# Patient Record
Sex: FEMALE | Race: WHITE | Employment: OTHER | URBAN - METROPOLITAN AREA
[De-identification: names, ages, dates, MRNs, and addresses within clinical notes are randomized per-mention and may not be internally consistent; named-entity substitution may affect disease eponyms.]

---

## 2017-09-30 ENCOUNTER — APPOINTMENT (OUTPATIENT)
Dept: GENERAL RADIOLOGY | Age: 72
DRG: 482 | End: 2017-09-30
Attending: INTERNAL MEDICINE
Payer: MEDICARE

## 2017-09-30 ENCOUNTER — APPOINTMENT (OUTPATIENT)
Dept: GENERAL RADIOLOGY | Age: 72
DRG: 482 | End: 2017-09-30
Attending: EMERGENCY MEDICINE
Payer: MEDICARE

## 2017-09-30 ENCOUNTER — HOSPITAL ENCOUNTER (INPATIENT)
Age: 72
LOS: 3 days | Discharge: NURSING, REHAB OR LONG-TERM CARE | DRG: 482 | End: 2017-10-03
Attending: EMERGENCY MEDICINE | Admitting: INTERNAL MEDICINE
Payer: MEDICARE

## 2017-09-30 DIAGNOSIS — S72.001A HIP FRACTURE, RIGHT, CLOSED, INITIAL ENCOUNTER (HCC): Primary | ICD-10-CM
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
## 2017-09-30 PROBLEM — S72.21XA DISPLACED SUBTROCHANTERIC FRACTURE OF RIGHT FEMUR, INITIAL ENCOUNTER FOR CLOSED FRACTURE (HCC): Status: ACTIVE | Noted: 2017-09-30

## 2017-09-30 PROBLEM — R03.0 ELEVATED BP WITHOUT DIAGNOSIS OF HYPERTENSION: Status: ACTIVE | Noted: 2017-09-30

## 2017-09-30 LAB
ABO + RH BLD: NORMAL
ALBUMIN SERPL-MCNC: 3.4 G/DL (ref 3.5–5)
ALBUMIN/GLOB SERPL: 0.9 {RATIO} (ref 1.1–2.2)
ALP SERPL-CCNC: 75 U/L (ref 45–117)
ALT SERPL-CCNC: 21 U/L (ref 12–78)
ANION GAP SERPL CALC-SCNC: 7 MMOL/L (ref 5–15)
APPEARANCE UR: CLEAR
AST SERPL-CCNC: 15 U/L (ref 15–37)
BACTERIA URNS QL MICRO: NEGATIVE /HPF
BASOPHILS # BLD: 0 K/UL (ref 0–0.1)
BASOPHILS NFR BLD: 0 % (ref 0–1)
BILIRUB SERPL-MCNC: 0.5 MG/DL (ref 0.2–1)
BILIRUB UR QL: NEGATIVE
BLOOD GROUP ANTIBODIES SERPL: NORMAL
BUN SERPL-MCNC: 16 MG/DL (ref 6–20)
BUN/CREAT SERPL: 22 (ref 12–20)
CALCIUM SERPL-MCNC: 8.2 MG/DL (ref 8.5–10.1)
CAOX CRY URNS QL MICRO: ABNORMAL
CHLORIDE SERPL-SCNC: 109 MMOL/L (ref 97–108)
CO2 SERPL-SCNC: 24 MMOL/L (ref 21–32)
COLOR UR: ABNORMAL
CREAT SERPL-MCNC: 0.73 MG/DL (ref 0.55–1.02)
EOSINOPHIL # BLD: 0.1 K/UL (ref 0–0.4)
EOSINOPHIL NFR BLD: 1 % (ref 0–7)
EPITH CASTS URNS QL MICRO: ABNORMAL /LPF
ERYTHROCYTE [DISTWIDTH] IN BLOOD BY AUTOMATED COUNT: 13.7 % (ref 11.5–14.5)
GLOBULIN SER CALC-MCNC: 3.7 G/DL (ref 2–4)
GLUCOSE BLD STRIP.AUTO-MCNC: 122 MG/DL (ref 65–100)
GLUCOSE SERPL-MCNC: 112 MG/DL (ref 65–100)
GLUCOSE UR STRIP.AUTO-MCNC: NEGATIVE MG/DL
HCT VFR BLD AUTO: 31.9 % (ref 35–47)
HGB BLD-MCNC: 10.9 G/DL (ref 11.5–16)
HGB UR QL STRIP: NEGATIVE
INR PPP: 1.1 (ref 0.9–1.1)
KETONES UR QL STRIP.AUTO: NEGATIVE MG/DL
LEUKOCYTE ESTERASE UR QL STRIP.AUTO: NEGATIVE
LYMPHOCYTES # BLD: 1.6 K/UL (ref 0.8–3.5)
LYMPHOCYTES NFR BLD: 22 % (ref 12–49)
MAGNESIUM SERPL-MCNC: 2.1 MG/DL (ref 1.6–2.4)
MCH RBC QN AUTO: 31.7 PG (ref 26–34)
MCHC RBC AUTO-ENTMCNC: 34.2 G/DL (ref 30–36.5)
MCV RBC AUTO: 92.7 FL (ref 80–99)
MONOCYTES # BLD: 0.5 K/UL (ref 0–1)
MONOCYTES NFR BLD: 7 % (ref 5–13)
MUCOUS THREADS URNS QL MICRO: ABNORMAL /LPF
NEUTS SEG # BLD: 4.9 K/UL (ref 1.8–8)
NEUTS SEG NFR BLD: 70 % (ref 32–75)
NITRITE UR QL STRIP.AUTO: NEGATIVE
PH UR STRIP: 5.5 [PH] (ref 5–8)
PLATELET # BLD AUTO: 153 K/UL (ref 150–400)
POTASSIUM SERPL-SCNC: 3.9 MMOL/L (ref 3.5–5.1)
PROT SERPL-MCNC: 7.1 G/DL (ref 6.4–8.2)
PROT UR STRIP-MCNC: NEGATIVE MG/DL
PROTHROMBIN TIME: 10.6 SEC (ref 9–11.1)
RBC # BLD AUTO: 3.44 M/UL (ref 3.8–5.2)
RBC #/AREA URNS HPF: ABNORMAL /HPF (ref 0–5)
SERVICE CMNT-IMP: ABNORMAL
SODIUM SERPL-SCNC: 140 MMOL/L (ref 136–145)
SP GR UR REFRACTOMETRY: 1.02 (ref 1–1.03)
SPECIMEN EXP DATE BLD: NORMAL
UROBILINOGEN UR QL STRIP.AUTO: 0.2 EU/DL (ref 0.2–1)
WBC # BLD AUTO: 7.1 K/UL (ref 3.6–11)
WBC URNS QL MICRO: ABNORMAL /HPF (ref 0–4)

## 2017-09-30 PROCEDURE — 81001 URINALYSIS AUTO W/SCOPE: CPT | Performed by: EMERGENCY MEDICINE

## 2017-09-30 PROCEDURE — 36415 COLL VENOUS BLD VENIPUNCTURE: CPT | Performed by: EMERGENCY MEDICINE

## 2017-09-30 PROCEDURE — 65660000000 HC RM CCU STEPDOWN

## 2017-09-30 PROCEDURE — 73502 X-RAY EXAM HIP UNI 2-3 VIEWS: CPT

## 2017-09-30 PROCEDURE — 82962 GLUCOSE BLOOD TEST: CPT

## 2017-09-30 PROCEDURE — 86900 BLOOD TYPING SEROLOGIC ABO: CPT | Performed by: NURSE PRACTITIONER

## 2017-09-30 PROCEDURE — 85025 COMPLETE CBC W/AUTO DIFF WBC: CPT | Performed by: EMERGENCY MEDICINE

## 2017-09-30 PROCEDURE — 71010 XR CHEST SNGL V: CPT

## 2017-09-30 PROCEDURE — 51702 INSERT TEMP BLADDER CATH: CPT

## 2017-09-30 PROCEDURE — 74011250637 HC RX REV CODE- 250/637: Performed by: NURSE PRACTITIONER

## 2017-09-30 PROCEDURE — 96374 THER/PROPH/DIAG INJ IV PUSH: CPT

## 2017-09-30 PROCEDURE — 74011250636 HC RX REV CODE- 250/636: Performed by: NURSE PRACTITIONER

## 2017-09-30 PROCEDURE — 96376 TX/PRO/DX INJ SAME DRUG ADON: CPT

## 2017-09-30 PROCEDURE — 87086 URINE CULTURE/COLONY COUNT: CPT | Performed by: INTERNAL MEDICINE

## 2017-09-30 PROCEDURE — 96361 HYDRATE IV INFUSION ADD-ON: CPT

## 2017-09-30 PROCEDURE — 80053 COMPREHEN METABOLIC PANEL: CPT | Performed by: EMERGENCY MEDICINE

## 2017-09-30 PROCEDURE — 83735 ASSAY OF MAGNESIUM: CPT | Performed by: EMERGENCY MEDICINE

## 2017-09-30 PROCEDURE — 99285 EMERGENCY DEPT VISIT HI MDM: CPT

## 2017-09-30 PROCEDURE — 85610 PROTHROMBIN TIME: CPT | Performed by: EMERGENCY MEDICINE

## 2017-09-30 PROCEDURE — 74011250636 HC RX REV CODE- 250/636: Performed by: EMERGENCY MEDICINE

## 2017-09-30 PROCEDURE — 93005 ELECTROCARDIOGRAM TRACING: CPT

## 2017-09-30 RX ORDER — SODIUM CHLORIDE 0.9 % (FLUSH) 0.9 %
5-10 SYRINGE (ML) INJECTION AS NEEDED
Status: DISCONTINUED | OUTPATIENT
Start: 2017-09-30 | End: 2017-10-02

## 2017-09-30 RX ORDER — ACETAMINOPHEN 325 MG/1
650 TABLET ORAL
Status: DISCONTINUED | OUTPATIENT
Start: 2017-09-30 | End: 2017-09-30

## 2017-09-30 RX ORDER — MORPHINE SULFATE 2 MG/ML
4 INJECTION, SOLUTION INTRAMUSCULAR; INTRAVENOUS
Status: COMPLETED | OUTPATIENT
Start: 2017-09-30 | End: 2017-09-30

## 2017-09-30 RX ORDER — SODIUM CHLORIDE 0.9 % (FLUSH) 0.9 %
5-10 SYRINGE (ML) INJECTION EVERY 8 HOURS
Status: DISCONTINUED | OUTPATIENT
Start: 2017-09-30 | End: 2017-10-02

## 2017-09-30 RX ORDER — SODIUM CHLORIDE 9 MG/ML
100 INJECTION, SOLUTION INTRAVENOUS CONTINUOUS
Status: DISCONTINUED | OUTPATIENT
Start: 2017-09-30 | End: 2017-09-30

## 2017-09-30 RX ORDER — OXYCODONE HYDROCHLORIDE 5 MG/1
10 TABLET ORAL
Status: DISCONTINUED | OUTPATIENT
Start: 2017-09-30 | End: 2017-10-01

## 2017-09-30 RX ORDER — AMOXICILLIN 250 MG
2 CAPSULE ORAL 2 TIMES DAILY
Status: DISCONTINUED | OUTPATIENT
Start: 2017-09-30 | End: 2017-10-01

## 2017-09-30 RX ORDER — SODIUM CHLORIDE 0.9 % (FLUSH) 0.9 %
5-10 SYRINGE (ML) INJECTION EVERY 8 HOURS
Status: DISCONTINUED | OUTPATIENT
Start: 2017-09-30 | End: 2017-10-03 | Stop reason: HOSPADM

## 2017-09-30 RX ORDER — OXYCODONE HYDROCHLORIDE 5 MG/1
2.5 TABLET ORAL
Status: DISCONTINUED | OUTPATIENT
Start: 2017-09-30 | End: 2017-09-30

## 2017-09-30 RX ORDER — SODIUM CHLORIDE 9 MG/ML
75 INJECTION, SOLUTION INTRAVENOUS CONTINUOUS
Status: DISCONTINUED | OUTPATIENT
Start: 2017-09-30 | End: 2017-10-02

## 2017-09-30 RX ORDER — MORPHINE SULFATE 2 MG/ML
2 INJECTION, SOLUTION INTRAMUSCULAR; INTRAVENOUS
Status: DISCONTINUED | OUTPATIENT
Start: 2017-09-30 | End: 2017-09-30

## 2017-09-30 RX ORDER — OXYCODONE HYDROCHLORIDE 5 MG/1
5 TABLET ORAL
Status: DISCONTINUED | OUTPATIENT
Start: 2017-09-30 | End: 2017-09-30

## 2017-09-30 RX ORDER — ONDANSETRON 2 MG/ML
4 INJECTION INTRAMUSCULAR; INTRAVENOUS
Status: DISCONTINUED | OUTPATIENT
Start: 2017-09-30 | End: 2017-10-03 | Stop reason: HOSPADM

## 2017-09-30 RX ORDER — NALOXONE HYDROCHLORIDE 0.4 MG/ML
0.4 INJECTION, SOLUTION INTRAMUSCULAR; INTRAVENOUS; SUBCUTANEOUS AS NEEDED
Status: DISCONTINUED | OUTPATIENT
Start: 2017-09-30 | End: 2017-10-03 | Stop reason: HOSPADM

## 2017-09-30 RX ORDER — OXYCODONE HYDROCHLORIDE 5 MG/1
10 TABLET ORAL
Status: DISCONTINUED | OUTPATIENT
Start: 2017-09-30 | End: 2017-09-30

## 2017-09-30 RX ORDER — HYDROMORPHONE HYDROCHLORIDE 1 MG/ML
1 INJECTION, SOLUTION INTRAMUSCULAR; INTRAVENOUS; SUBCUTANEOUS
Status: DISCONTINUED | OUTPATIENT
Start: 2017-09-30 | End: 2017-10-01

## 2017-09-30 RX ORDER — ACETAMINOPHEN 325 MG/1
650 TABLET ORAL EVERY 6 HOURS
Status: DISCONTINUED | OUTPATIENT
Start: 2017-09-30 | End: 2017-10-03 | Stop reason: HOSPADM

## 2017-09-30 RX ORDER — LABETALOL HYDROCHLORIDE 5 MG/ML
10 INJECTION, SOLUTION INTRAVENOUS
Status: DISCONTINUED | OUTPATIENT
Start: 2017-09-30 | End: 2017-10-03 | Stop reason: HOSPADM

## 2017-09-30 RX ORDER — SODIUM CHLORIDE 0.9 % (FLUSH) 0.9 %
5-10 SYRINGE (ML) INJECTION AS NEEDED
Status: DISCONTINUED | OUTPATIENT
Start: 2017-09-30 | End: 2017-10-03 | Stop reason: HOSPADM

## 2017-09-30 RX ORDER — ONDANSETRON 2 MG/ML
4 INJECTION INTRAMUSCULAR; INTRAVENOUS
Status: DISCONTINUED | OUTPATIENT
Start: 2017-09-30 | End: 2017-09-30

## 2017-09-30 RX ADMIN — SODIUM CHLORIDE 75 ML/HR: 900 INJECTION, SOLUTION INTRAVENOUS at 16:52

## 2017-09-30 RX ADMIN — MORPHINE SULFATE 2 MG: 2 INJECTION, SOLUTION INTRAMUSCULAR; INTRAVENOUS at 19:22

## 2017-09-30 RX ADMIN — ONDANSETRON 4 MG: 2 INJECTION INTRAMUSCULAR; INTRAVENOUS at 14:52

## 2017-09-30 RX ADMIN — Medication 10 ML: at 16:56

## 2017-09-30 RX ADMIN — ONDANSETRON 4 MG: 2 INJECTION INTRAMUSCULAR; INTRAVENOUS at 21:05

## 2017-09-30 RX ADMIN — OXYCODONE HYDROCHLORIDE 5 MG: 5 TABLET ORAL at 19:11

## 2017-09-30 RX ADMIN — OXYCODONE HYDROCHLORIDE 5 MG: 5 TABLET ORAL at 14:53

## 2017-09-30 RX ADMIN — OXYCODONE HYDROCHLORIDE 10 MG: 5 TABLET ORAL at 22:00

## 2017-09-30 RX ADMIN — MORPHINE SULFATE 4 MG: 2 INJECTION, SOLUTION INTRAMUSCULAR; INTRAVENOUS at 12:34

## 2017-09-30 RX ADMIN — MORPHINE SULFATE 4 MG: 2 INJECTION, SOLUTION INTRAMUSCULAR; INTRAVENOUS at 14:14

## 2017-09-30 RX ADMIN — ACETAMINOPHEN 650 MG: 325 TABLET ORAL at 14:52

## 2017-09-30 RX ADMIN — SODIUM CHLORIDE 1000 ML: 0.9 INJECTION, SOLUTION INTRAVENOUS at 12:35

## 2017-09-30 RX ADMIN — HYDROMORPHONE HYDROCHLORIDE 1 MG: 1 INJECTION, SOLUTION INTRAMUSCULAR; INTRAVENOUS; SUBCUTANEOUS at 21:00

## 2017-09-30 NOTE — IP AVS SNAPSHOT
[address] [facility] 
[phone] Patient: Hudson Hooper MRN: [id number] You are allergic to the following No active allergies Recent Documentation Height Weight BMI OB Status Smoking Status 1.702 m 72.3 kg 24.96 kg/m2 Hysterectomy Never Smoker Unresulted Labs Order Current Status SAMPLE TO BLOOD BANK In process Emergency Contacts Name Discharge Info Relation Home Work Mobile [place] N/A  AT THIS TIME [6] Friend [5] [phone] About your hospitalization You were admitted on:  September 30, 2017 You last received care in the:  [facility] [place] [facility] You were discharged on:  October 3, 2017 Unit phone number:  [phone] Why you were hospitalized Your primary diagnosis was:  Not on File Your diagnoses also included:  Displaced Subtrochanteric Fracture Of Right Femur, Initial Encounter For Closed Fracture (Hcc), Elevated Bp Without Diagnosis Of Hypertension Providers Seen During Your Hospitalizations Provider Role Specialty Primary office phone Jessa Luna MD Attending Provider Emergency Medicine [phone] Bharathi Lam MD Attending Provider Internal Medicine [phone] Jayro Gagnon MD Attending Provider Internal Medicine [phone] Your Primary Care Physician (PCP) Primary Care Physician Office Phone Office Fax NONE ** None ** ** None ** Follow-up Information Follow up With Details Comments Contact Info [address] On 10/3/2017 This is your rehab provider. [address] [facility] [address]   [address] [address] 
[phone] None   None ([phone]) Patient stated that they have no PCP Please provide list of Pcp's Liana Francisco MD Schedule an appointment as soon as possible for a visit in 2 weeks  [address] Dagoberto Alamnzar 
[phone] Current Discharge Medication List  
  
START taking these medications Dose & Instructions Dispensing Information Comments Morning Noon Evening Bedtime  
 acetaminophen 325 mg tablet Commonly known as:  TYLENOL Your last dose was: Your next dose is:    
   
   
 Dose:  650 mg Take 2 Tabs by mouth every six (6) hours for 14 days. Quantity:  112 Tab Refills:  0  
     
   
   
   
  
 aspirin 81 mg chewable tablet Your last dose was: Your next dose is:    
   
   
 Dose:  81 mg Take 1 Tab by mouth two (2) times a day for 30 days. Quantity:  60 Tab Refills:  0  
     
   
   
   
  
 famotidine 20 mg tablet Commonly known as:  PEPCID Your last dose was: Your next dose is:    
   
   
 Dose:  20 mg Take 1 Tab by mouth two (2) times a day for 30 days. Quantity:  60 Tab Refills:  0  
     
   
   
   
  
 oxyCODONE IR 5 mg immediate release tablet Commonly known as:  Renee Lan Your last dose was: Your next dose is:    
   
   
 Dose:  5 mg Take 1 Tab by mouth every six (6) hours as needed for up to 7 days. Max Daily Amount: 20 mg.  
 Quantity:  20 Tab Refills:  0  
     
   
   
   
  
 polyethylene glycol 17 gram packet Commonly known as:  Lelan Situ Your last dose was: Your next dose is:    
   
   
 Dose:  17 g Take 1 Packet by mouth daily as needed. Quantity:  15 Packet Refills:  0  
     
   
   
   
  
 senna-docusate 8.6-50 mg per tablet Commonly known as:  SENNA PLUS Your last dose was: Your next dose is:    
   
   
 Dose:  1 Tab Take 1 Tab by mouth two (2) times a day. Quantity:  60 Tab Refills:  0 Where to Get Your Medications Information on where to get these meds will be given to you by the nurse or doctor. ! Ask your nurse or doctor about these medications acetaminophen 325 mg tablet  
 aspirin 81 mg chewable tablet  
 famotidine 20 mg tablet  
 oxyCODONE IR 5 mg immediate release tablet  
 polyethylene glycol 17 gram packet  
 senna-docusate 8.6-50 mg per tablet Discharge Instructions Diet: heart healthy Activity: Per rehab physician's orders Cbc in 1 week. [address] [facility] Shedrick Lennox Discharge Instruction Sheet: Hip Fracture Repair Dr. Jenn Quan Blood Clot Prevention ? Aspirin 81 mg twice daily for 30 days. Take pepcid while on Aspirin to prevent stomach upset. Pain control: 
Medications ? Typically, we will prescribe a narcotic usually 1-2 tabs every three to four hours as needed for your pain. Most patients need this only for the first few weeks. ? You should discontinue this as the pain decreases. ? Some of these medications contain a large dose of Tylenol (acetaminophen). Therefore, you should consult your pharmacist before taking any additional Tylenol at the same time. You should not drive while taking any narcotic pain medications. Take your pain medications with food to prevent nausea! Your last dose of pain medication in the hospital was given @ :__________ Ice Therapy ? You will be discharged home with ice/gel packs. ? Continue using these regularly to minimize pain and swelling, especially after physical activity. Constipation ? Pain medication and anesthesia can be constipating-this can be prevented by gentle physical activity and drinking plenty of fluid. ? It should be treated with over-the-counter medications such as Dulcolax, Miralax, Magnesium Citrate and/or Fleets enema. ? You should have a bowel movement at least every other day following surgery. Incision care ? You will be discharged with a transparent and waterproof dressing over your incision. o This should remain in place for 5-7 days after discharge. ? You will be given one additional dressing to use at home in 5-7 days if you are still having drainage ? You may shower with this dressing in place after you are discharged. o After showering pat the dressing/incision dry. ? When the incision is no longer draining, it may be left open to the air. ? DO NOT rub the incision. ? DO NOT take a tub bath or go swimming until cleared by your doctor. ? DO NOT apply lotions, oils, or creams to incision. To increase and promote healing: 
? Stop Smoking (or at least cut back on smoking). ? Eat a well-balanced diet (high in protein and vitamin C) ? If your appetite is poor, consider nutritional supplements like Ensure, Glucerna, or [employer] Instant Breakfast. 
? If you are diabetic, controlling you blood sugars is very important to prevent infection and promote wound healing. Nutrition: ? If you were on a supplement such as Ensure or Glucerna) while in the hospital, please continue using them with each meal for the next 30 days. ? Eat a well-balanced diet - High in protein, high in vitamins and minerals, especially vitamin C and zinc.  
 
Home Health: 
? If you have staples a home health nurse will remove them in about 10 days. ? Physical Therapy will come to your home to continue training for walking, transferring and exercises Physical Activity ? You can weight bear as tolerated on your new hip TTWB. ? Bed-rest may slow your recovery. ? Use your walker and take short walks about every 2 hours. ? Increase your distance each day. ? NO DRIVING until told to do so. Warning signs: Please call your physician immediately at [phone] if you have ? Bleeding from incision that is constant. ? Change in mental status (unusual behavior or confusion) ? If your incision develops redness or swelling 
? Change in wound drainage (increase in amount, color, or foul odor) ? Temperature over 101.5 degrees Fahrenheit ? Pain in the calf of your leg ? Tenderness or redness in the calf of your leg 
? Increased swelling of the thigh, ankle, calf, or foot. Emergency: CALL 911 if you have ? Shortness of breath ? Chest pain ? Localized chest pain when coughing or taking a deep breath Follow-up ? Please call your surgeons office at [phone] for a follow up appointment. o You should call as soon as you get home or the next day after discharge. o Ask to make an appointment in 2 weeks. ? You can return to work when cleared by a physician. During normal business hours you may reach Dr. Gi Goode' team directly at [phone] if you have concerns or questions. Discharge Orders None Introducing [facility] SERVICES! Elly Kelley introduces [employer] patient portal. Now you can access parts of your medical record, email your doctor's office, and request medication refills online. 1. In your internet browser, go to [email or web address]://[employer] 2. Click on the First Time User? Click Here link in the Sign In box. You will see the New Member Sign Up page. 3. Enter your [employer] Access Code exactly as it appears below. You will not need to use this code after youve completed the sign-up process. If you do not sign up before the expiration date, you must request a new code. · [employer] Access Code: [email or web address] Expires: 12/29/2017 12:37 PM 
 
4. Enter the last four digits of your Social Security Number (xxxx) and Date of Birth (mm/dd/yyyy) as indicated and click Submit. You will be taken to the next sign-up page. 5. Create a ABB[employer] ID. This will be your [employer] login ID and cannot be changed, so think of one that is secure and easy to remember. 6. Create a [employer] password. You can change your password at any time. 7. Enter your Password Reset Question and Answer. This can be used at a later time if you forget your password. 8. Enter your e-mail address. You will receive e-mail notification when new information is available in [address] 9. Click Sign Up. You can now view and download portions of your medical record. 10. Click the Download Summary menu link to download a portable copy of your medical information. If you have questions, please visit the Frequently Asked Questions section of the [employer] website. Remember, [employer] is NOT to be used for urgent needs. For medical emergencies, dial 911. Now available from your iPhone and Android! General Information Please provide this summary of care documentation to your next provider. Patient Signature:  ____________________________________________________________ Date:  ____________________________________________________________  
  
Sanjay Santos Provider Signature:  ____________________________________________________________ Date:  ____________________________________________________________

## 2017-09-30 NOTE — H&P
Hospitalist Admission Note    NAME: Gabe Vallejo   :  1945   MRN:  [id number]     Date/Time:  2017 2:58 PM    Patient PCP: None  ________________________________________________________________________    My assessment of this patient's clinical condition and my plan of care is as follows. Assessment / Plan:  Right Hip Fracture: will need surgical approach, patient has no PMH and she carries a low risk for intermediate risk procedure, noted plans for tomorrow AM, at this time patient does not shows signs of CHF, has no chest pain. Elevated BP: no Dx of HTN, will use labetalol prn. Code Status: Full Code  Surrogate Decision Maker: anastacio Simpson [id number]  DVT Prophylaxis: As per Ortho  GI Prophylaxis: not indicated  Baseline: Independent full ADL, lives by herself        Subjective:   CHIEF COMPLAINT: \"I fell and my right hip hurts\"    HISTORY OF PRESENT ILLNESS:     Gabe Vallejo is a 71 y.o.  female  without significant PMHx, presenting per EMS to ED c/o constant 9/10 sharp/non-radiating right hip pain s/p GLF PTA. Pt reports pain is worsened with any movement of her right hip. She notes she has been unable to ambulate secondary to pain since the incident. Pt specifically denies any fevers, chills, nausea, vomiting, chest pain, shortness of breath, headache, rash, diarrhea, sweating or weight loss. At this time patient is lying in bed c/o right hip pain, she trip and fell today, denies chest pain, no SOB, no N/V no diarrhea, no fever, no cough, no urinary symptoms, no other associated symptoms. We were asked to admit for work up and evaluation of the above problems. History reviewed. No pertinent past medical history.      Past Surgical History:   Procedure Laterality Date    HX GYN       hysterctomy    HX ORTHOPAEDIC      foot surgery       Social History   Substance Use Topics    Smoking status: Never Smoker    Smokeless tobacco: Never Used    Alcohol use Yes      Comment: 1-2 glasses of wine 1-2 a week        Family History   Problem Relation Age of Onset    Heart Attack Mother      No Known Allergies     Prior to Admission medications    Not on File       REVIEW OF SYSTEMS:     I am not able to complete the review of systems because: The patient is intubated and sedated    The patient has altered mental status due to his acute medical problems    The patient has baseline aphasia from prior stroke(s)    The patient has baseline dementia and is not reliable historian    The patient is in acute medical distress and unable to provide information           Total of 12 systems reviewed as follows:       POSITIVE= underlined text  Negative = text not underlined  General:  fever, chills, sweats, generalized weakness, weight loss/gain,      loss of appetite   Eyes:    blurred vision, eye pain, loss of vision, double vision  ENT:    rhinorrhea, pharyngitis   Respiratory:   cough, sputum production, SOB, ALBERTO, wheezing, pleuritic pain   Cardiology:   chest pain, palpitations, orthopnea, PND, edema, syncope   Gastrointestinal:  abdominal pain , N/V, diarrhea, dysphagia, constipation, bleeding   Genitourinary:  frequency, urgency, dysuria, hematuria, incontinence   Muskuloskeletal :  arthralgia, myalgia, back pain  Hematology:  easy bruising, nose or gum bleeding, lymphadenopathy   Dermatological: rash, ulceration, pruritis, color change / jaundice  Endocrine:   hot flashes or polydipsia   Neurological:  headache, dizziness, confusion, focal weakness, paresthesia,     Speech difficulties, memory loss, gait difficulty  Psychological: Feelings of anxiety, depression, agitation    Objective:   VITALS:    Visit Vitals    /76    Pulse 69    Temp 97.6 °F (36.4 °C)    Resp 17    Ht 5' 7\" (1.702 m)    Wt 72.3 kg (159 lb 6.3 oz)    SpO2 98%    BMI 24.96 kg/m2       PHYSICAL EXAM:    General:    Alert, cooperative, no distress, appears stated age. HEENT: Atraumatic, anicteric sclerae, pink conjunctivae     No oral ulcers, mucosa moist, throat clear, dentition fair  Neck:  Supple, symmetrical,  thyroid: non tender  Lungs:   Clear to auscultation bilaterally. No Wheezing or Rhonchi. No rales. Chest wall:  No tenderness  No Accessory muscle use. Heart:   Regular  rhythm,  No  murmur   No edema  Abdomen:   Soft, non-tender. Not distended. Bowel sounds normal  Extremities: No cyanosis. No clubbing, right leg is shortened and externally rotated     Skin turgor normal, Capillary refill normal, Radial pulse 2+  Skin:     Not pale. Not Jaundiced  No rashes   Psych:  Good insight. Not depressed. Not anxious or agitated. Neurologic: EOMs intact. No facial asymmetry. No aphasia or slurred speech. Symmetrical strength, Sensation grossly intact. Alert and oriented X 4.     _______________________________________________________________________  Care Plan discussed with:    Comments   Patient y    Family      RN y    Care Manager                    Consultant:      _______________________________________________________________________  Expected  Disposition:   Home with Family    HH/PT/OT/RN    SNF/LTC    DESHAUN y   ________________________________________________________________________  TOTAL TIME:  48 Minutes    Critical Care Provided     Minutes non procedure based      Comments    y Reviewed previous records   >50% of visit spent in counseling and coordination of care y Discussion with patient and/or family and questions answered       ________________________________________________________________________  Signed: Danilo Layne MD    Procedures: see electronic medical records for all procedures/Xrays and details which were not copied into this note but were reviewed prior to creation of Plan.     LAB DATA REVIEWED:    Recent Results (from the past 24 hour(s))   EKG, 12 LEAD, INITIAL    Collection Time: 09/30/17 12:43 PM   Result Value Ref Range Ventricular Rate 60 BPM    Atrial Rate 60 BPM    P-R Interval 126 ms    QRS Duration 86 ms    Q-T Interval 444 ms    QTC Calculation (Bezet) 444 ms    Calculated P Axis 29 degrees    Calculated R Axis 12 degrees    Calculated T Axis 31 degrees    Diagnosis       Normal sinus rhythm  Low voltage QRS  Cannot rule out Anterior infarct , age undetermined  No previous ECGs available     CBC WITH AUTOMATED DIFF    Collection Time: 09/30/17  1:12 PM   Result Value Ref Range    WBC 7.1 3.6 - 11.0 K/uL    RBC 3.44 (L) 3.80 - 5.20 M/uL    HGB 10.9 (L) 11.5 - 16.0 g/dL    HCT 31.9 (L) 35.0 - 47.0 %    MCV 92.7 80.0 - 99.0 FL    MCH 31.7 26.0 - 34.0 PG    MCHC 34.2 30.0 - 36.5 g/dL    RDW 13.7 11.5 - 14.5 %    PLATELET 698 729 - 332 K/uL    NEUTROPHILS 70 32 - 75 %    LYMPHOCYTES 22 12 - 49 %    MONOCYTES 7 5 - 13 %    EOSINOPHILS 1 0 - 7 %    BASOPHILS 0 0 - 1 %    ABS. NEUTROPHILS 4.9 1.8 - 8.0 K/UL    ABS. LYMPHOCYTES 1.6 0.8 - 3.5 K/UL    ABS. MONOCYTES 0.5 0.0 - 1.0 K/UL    ABS. EOSINOPHILS 0.1 0.0 - 0.4 K/UL    ABS.  BASOPHILS 0.0 0.0 - 0.1 K/UL   PROTHROMBIN TIME + INR    Collection Time: 09/30/17  1:12 PM   Result Value Ref Range    INR 1.1 0.9 - 1.1      Prothrombin time 10.6 9.0 - 11.1 sec   URINALYSIS W/MICROSCOPIC    Collection Time: 09/30/17  1:12 PM   Result Value Ref Range    Color YELLOW/STRAW      Appearance CLEAR CLEAR      Specific gravity 1.023 1.003 - 1.030      pH (UA) 5.5 5.0 - 8.0      Protein NEGATIVE  NEG mg/dL    Glucose NEGATIVE  NEG mg/dL    Ketone NEGATIVE  NEG mg/dL    Bilirubin NEGATIVE  NEG      Blood NEGATIVE  NEG      Urobilinogen 0.2 0.2 - 1.0 EU/dL    Nitrites NEGATIVE  NEG      Leukocyte Esterase NEGATIVE  NEG      WBC 0-4 0 - 4 /hpf    RBC 0-5 0 - 5 /hpf    Epithelial cells FEW FEW /lpf    Bacteria NEGATIVE  NEG /hpf    Mucus 1+ (A) NEG /lpf    CA Oxalate crystals FEW (A) NEG     METABOLIC PANEL, COMPREHENSIVE    Collection Time: 09/30/17  2:08 PM   Result Value Ref Range    Sodium 140 136 - 145 mmol/L    Potassium 3.9 3.5 - 5.1 mmol/L    Chloride 109 (H) 97 - 108 mmol/L    CO2 24 21 - 32 mmol/L    Anion gap 7 5 - 15 mmol/L    Glucose 112 (H) 65 - 100 mg/dL    BUN 16 6 - 20 MG/DL    Creatinine 0.73 0.55 - 1.02 MG/DL    BUN/Creatinine ratio 22 (H) 12 - 20      GFR est AA >60 >60 ml/min/1.73m2    GFR est non-AA >60 >60 ml/min/1.73m2    Calcium 8.2 (L) 8.5 - 10.1 MG/DL    Bilirubin, total 0.5 0.2 - 1.0 MG/DL    ALT (SGPT) 21 12 - 78 U/L    AST (SGOT) 15 15 - 37 U/L    Alk.  phosphatase 75 45 - 117 U/L    Protein, total 7.1 6.4 - 8.2 g/dL    Albumin 3.4 (L) 3.5 - 5.0 g/dL    Globulin 3.7 2.0 - 4.0 g/dL    A-G Ratio 0.9 (L) 1.1 - 2.2     MAGNESIUM    Collection Time: 09/30/17  2:08 PM   Result Value Ref Range    Magnesium 2.1 1.6 - 2.4 mg/dL   GLUCOSE, POC    Collection Time: 09/30/17  2:33 PM   Result Value Ref Range    Glucose (POC) 122 (H) 65 - 100 mg/dL    Performed by Stephen Tena

## 2017-09-30 NOTE — ED NOTES
TRANSFER - OUT REPORT:    Verbal report given to Adair REGAN RN(name) on Remington Naranjo  being transferred to Ortho(unit) for routine progression of care       Report consisted of patients Situation, Background, Assessment and   Recommendations(SBAR). Information from the following report(s) SBAR, Kardex, ED Summary, [facility] and Recent Results was reviewed with the receiving nurse. Lines:   Peripheral IV 09/30/17 Left Antecubital (Active)   Site Assessment Clean, dry, & intact 9/30/2017  2:59 PM   Phlebitis Assessment 0 9/30/2017  2:59 PM   Infiltration Assessment 0 9/30/2017  2:59 PM   Dressing Status Clean, dry, & intact 9/30/2017  2:59 PM   Dressing Type Transparent;Tape 9/30/2017  2:59 PM   Hub Color/Line Status Green 9/30/2017  1:22 PM        Opportunity for questions and clarification was provided.       Patient transported with:   transportation

## 2017-09-30 NOTE — ED NOTES
Bedside and Verbal shift change report given to [address] (oncoming nurse) by Dinah Gonzalez RN (offgoing nurse). Report included the following information SBAR and ED Summary.

## 2017-09-30 NOTE — ROUTINE PROCESS
Bedside and Verbal shift change report given to Caden Bartholomew (oncoming nurse) by Maribeth Ewing RN (offgoing nurse). Report included the following information SBAR, Kardex, Intake/Output, MAR and Recent Results.

## 2017-09-30 NOTE — ED NOTES
Patient resting comfortably, side rails up, call bell w/in reach, no further needs expressed at this time, aware of POC. Turned lights off for pt's comfort. Called lab to add on urine culture.

## 2017-09-30 NOTE — ED NOTES
Pt presents to ED via EMS for R. Leg/hip pain x today [place] Pt unable to stand on scene. Pt reports severe pain with movement. EMS started IV and gave pt 100 mcg Fentanyl for pain. Pt R. Leg externally rotated. Pt alert and oriented x 4. No LOC. No head injury.

## 2017-09-30 NOTE — CONSULTS
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 She had immediate pain and inability to weightbear. Transferred to the hospital by EMS. Denies loss of consciousness or other injury. No previous problems with the right hip. Denies numbness, tingling, weakness. History reviewed. No pertinent past medical history. Past Surgical History:   Procedure Laterality Date    HX GYN       hysterctomy    HX ORTHOPAEDIC      foot surgery      Prior to Admission medications    Not on File     No Known Allergies   Social History   Substance Use Topics    Smoking status: Never Smoker    Smokeless tobacco: Never Used    Alcohol use Yes      Comment: 1-2 glasses of wine 1-2 a week      History reviewed. No pertinent family history. REVIEW OF SYSTEMS: A comprehensive review of systems was negative except for that written in the HPI. Physical exam:  Pleasant 23-year-old female in mild distress. Mood and affect are normal.  She is alert and oriented. Respirations unlabored. Heart rate is regular. Right lower extremity is shortened and externally rotated. Any attempted range of motion is painful. Skin appears to be intact. Neurovascular exam distally is intact. Able to wiggle toes. Contralateral limb is unremarkable. Radiographs:  I reviewed emergency department films. She has a comminuted subtrochanteric fracture of the right femur. Assessment and plan:  Right subtrochanteric femur fracture. She ate recently. We will plan for operative fixation with intramedullary nail tomorrow morning. NPO after midnight. Consent.     Franko Short MD

## 2017-09-30 NOTE — ED PROVIDER NOTES
HPI Comments: Joel Cohi is a 71 y.o. female without significant PMHx, presenting per EMS to ED c/o constant 9/10 sharp/non-radiating right hip pain s/p GLF PTA. Pt reports pain is worsened with any movement of her right hip. She notes she has been unable to ambulate secondary to pain since the incident. Pt specifically denies any fevers, chills, nausea, vomiting, chest pain, shortness of breath, headache, rash, diarrhea, sweating or weight loss. PCP: None  Social Hx: never smoker; - EtOH; - drug use. There are no other complaints, changes, or physical findings at this time. Written by SHANTEL Pena, as dictated by [employer] Kayleen Huitron MD.      The history is provided by the patient and the EMS personnel. History reviewed. No pertinent past medical history. Past Surgical History:   Procedure Laterality Date    HX GYN       hysterctomy    HX ORTHOPAEDIC      foot surgery         History reviewed. No pertinent family history. Social History     Social History    Marital status: SINGLE     Spouse name: N/A    Number of children: N/A    Years of education: N/A     Occupational History    Not on file. Social History Main Topics    Smoking status: Never Smoker    Smokeless tobacco: Never Used    Alcohol use No    Drug use: No    Sexual activity: Not on file     Other Topics Concern    Not on file     Social History Narrative    No narrative on file         ALLERGIES: Review of patient's allergies indicates no known allergies. Review of Systems   Constitutional: Negative. Negative for activity change, appetite change, chills, fatigue, fever and unexpected weight change. HENT: Negative. Negative for congestion, hearing loss, rhinorrhea, sneezing and voice change. Eyes: Negative. Negative for pain and visual disturbance. Respiratory: Negative. Negative for apnea, cough, choking, chest tightness and shortness of breath. Cardiovascular: Negative. Negative for chest pain and palpitations. Gastrointestinal: Negative. Negative for abdominal distention, abdominal pain, blood in stool, diarrhea, nausea and vomiting. Genitourinary: Negative. Negative for difficulty urinating, flank pain, frequency and urgency. No discharge   Musculoskeletal: Positive for arthralgias (right hip). Negative for back pain, myalgias and neck stiffness. Skin: Negative. Negative for color change and rash. Neurological: Negative. Negative for dizziness, seizures, syncope, speech difficulty, weakness, numbness and headaches. Hematological: Negative for adenopathy. Psychiatric/Behavioral: Negative. Negative for agitation, behavioral problems, dysphoric mood and suicidal ideas. The patient is not nervous/anxious. All other systems reviewed and are negative. Physical Exam   Constitutional: She is oriented to person, place, and time. She appears well-developed and well-nourished. No distress. HENT:   Head: Normocephalic and atraumatic. Mouth/Throat: Oropharynx is clear and moist. No oropharyngeal exudate. Eyes: Conjunctivae and EOM are normal. Pupils are equal, round, and reactive to light. Right eye exhibits no discharge. Left eye exhibits no discharge. Neck: Normal range of motion. Neck supple. Cardiovascular: Normal rate, regular rhythm and intact distal pulses. Exam reveals no gallop and no friction rub. No murmur heard. Pulmonary/Chest: Effort normal and breath sounds normal. No respiratory distress. She has no wheezes. She has no rales. She exhibits no tenderness. Abdominal: Soft. Bowel sounds are normal. She exhibits no distension and no mass. There is no tenderness. There is no rebound and no guarding. Musculoskeletal:   Right Hip: LROM secondary to pain;   Lymphadenopathy:     She has no cervical adenopathy. Neurological: She is alert and oriented to person, place, and time. No cranial nerve deficit.  Coordination normal.   Skin: Skin is warm and dry. No rash noted. No erythema. Psychiatric: She has a normal mood and affect. Nursing note and vitals reviewed. MDM  Number of Diagnoses or Management Options  Fall, initial encounter:   Hip fracture, right, closed, initial encounter [facility]):   Diagnosis management comments: DDx: sprain, strain, contusion, fracture. Will assess with UA, basic labs, right hip films, and will treat with analgesia. Amount and/or Complexity of Data Reviewed  Clinical lab tests: ordered and reviewed  Tests in the radiology section of CPT®: ordered and reviewed  Tests in the medicine section of CPT®: ordered and reviewed  Obtain history from someone other than the patient: yes (EMS)  Review and summarize past medical records: yes  Discuss the patient with other providers: yes (Hospitalist)  Independent visualization of images, tracings, or specimens: yes    Patient Progress  Patient progress: stable    Procedures    Chief Complaint   Patient presents with    Fall     Pt presents via EMS for R. hip and leg pain x [place], No head injury, no LOC    Hip Pain       12:19 PM  The patients presenting problems have been discussed, and they are in agreement with the care plan formulated and outlined with them. I have encouraged them to ask questions as they arise throughout their visit.     MEDICATIONS GIVEN:  Medications   sodium chloride (NS) flush 5-10 mL (not administered)   sodium chloride (NS) flush 5-10 mL (not administered)   0.9% sodium chloride infusion (not administered)   sodium chloride 0.9 % bolus infusion 1,000 mL (1,000 mL IntraVENous New Bag 9/30/17 1235)   morphine injection 4 mg (4 mg IntraVENous Given 9/30/17 1234)       LABS REVIEWED:  Recent Results (from the past 24 hour(s))   EKG, 12 LEAD, INITIAL    Collection Time: 09/30/17 12:43 PM   Result Value Ref Range    Ventricular Rate 60 BPM    Atrial Rate 60 BPM    P-R Interval 126 ms    QRS Duration 86 ms    Q-T Interval 444 ms    QTC Calculation (Bezet) 444 ms    Calculated P Axis 29 degrees    Calculated R Axis 12 degrees    Calculated T Axis 31 degrees    Diagnosis       Normal sinus rhythm  Low voltage QRS  Cannot rule out Anterior infarct , age undetermined  No previous ECGs available     CBC WITH AUTOMATED DIFF    Collection Time: 09/30/17  1:12 PM   Result Value Ref Range    WBC 7.1 3.6 - 11.0 K/uL    RBC 3.44 (L) 3.80 - 5.20 M/uL    HGB 10.9 (L) 11.5 - 16.0 g/dL    HCT 31.9 (L) 35.0 - 47.0 %    MCV 92.7 80.0 - 99.0 FL    MCH 31.7 26.0 - 34.0 PG    MCHC 34.2 30.0 - 36.5 g/dL    RDW 13.7 11.5 - 14.5 %    PLATELET 477 470 - 586 K/uL    NEUTROPHILS 70 32 - 75 %    LYMPHOCYTES 22 12 - 49 %    MONOCYTES 7 5 - 13 %    EOSINOPHILS 1 0 - 7 %    BASOPHILS 0 0 - 1 %    ABS. NEUTROPHILS 4.9 1.8 - 8.0 K/UL    ABS. LYMPHOCYTES 1.6 0.8 - 3.5 K/UL    ABS. MONOCYTES 0.5 0.0 - 1.0 K/UL    ABS. EOSINOPHILS 0.1 0.0 - 0.4 K/UL    ABS.  BASOPHILS 0.0 0.0 - 0.1 K/UL   PROTHROMBIN TIME + INR    Collection Time: 09/30/17  1:12 PM   Result Value Ref Range    INR 1.1 0.9 - 1.1      Prothrombin time 10.6 9.0 - 11.1 sec   URINALYSIS W/MICROSCOPIC    Collection Time: 09/30/17  1:12 PM   Result Value Ref Range    Color YELLOW/STRAW      Appearance CLEAR CLEAR      Specific gravity 1.023 1.003 - 1.030      pH (UA) 5.5 5.0 - 8.0      Protein NEGATIVE  NEG mg/dL    Glucose NEGATIVE  NEG mg/dL    Ketone NEGATIVE  NEG mg/dL    Bilirubin NEGATIVE  NEG      Blood NEGATIVE  NEG      Urobilinogen 0.2 0.2 - 1.0 EU/dL    Nitrites NEGATIVE  NEG      Leukocyte Esterase NEGATIVE  NEG      WBC 0-4 0 - 4 /hpf    RBC 0-5 0 - 5 /hpf    Epithelial cells FEW FEW /lpf    Bacteria NEGATIVE  NEG /hpf    Mucus 1+ (A) NEG /lpf    CA Oxalate crystals FEW (A) NEG     METABOLIC PANEL, COMPREHENSIVE    Collection Time: 09/30/17  2:08 PM   Result Value Ref Range    Sodium 140 136 - 145 mmol/L    Potassium 3.9 3.5 - 5.1 mmol/L    Chloride 109 (H) 97 - 108 mmol/L    CO2 24 21 - 32 mmol/L    Anion gap 7 5 - 15 mmol/L    Glucose 112 (H) 65 - 100 mg/dL    BUN 16 6 - 20 MG/DL    Creatinine 0.73 0.55 - 1.02 MG/DL    BUN/Creatinine ratio 22 (H) 12 - 20      GFR est AA >60 >60 ml/min/1.73m2    GFR est non-AA >60 >60 ml/min/1.73m2    Calcium 8.2 (L) 8.5 - 10.1 MG/DL    Bilirubin, total 0.5 0.2 - 1.0 MG/DL    ALT (SGPT) 21 12 - 78 U/L    AST (SGOT) 15 15 - 37 U/L    Alk. phosphatase 75 45 - 117 U/L    Protein, total 7.1 6.4 - 8.2 g/dL    Albumin 3.4 (L) 3.5 - 5.0 g/dL    Globulin 3.7 2.0 - 4.0 g/dL    A-G Ratio 0.9 (L) 1.1 - 2.2     MAGNESIUM    Collection Time: 09/30/17  2:08 PM   Result Value Ref Range    Magnesium 2.1 1.6 - 2.4 mg/dL   GLUCOSE, POC    Collection Time: 09/30/17  2:33 PM   Result Value Ref Range    Glucose (POC) 122 (H) 65 - 100 mg/dL    Performed by [address]:  Patient Vitals for the past 12 hrs:   Temp Pulse Resp BP SpO2   09/30/17 1415 - 69 17 123/76 98 %   09/30/17 1402 - 69 18 139/80 100 %   09/30/17 1330 - 66 17 148/75 97 %   09/30/17 1315 - (!) 59 16 152/73 97 %   09/30/17 1310 - 61 13 146/74 99 %   09/30/17 1231 97.6 °F (36.4 °C) 62 18 (!) 185/106 98 %       RADIOLOGY RESULTS:  EXAM:  Chest single view.     INDICATION:  Right hip fracture.     COMPARISON: None.     FINDINGS: A single view of the chest demonstrates clear lungs. The cardiac and  mediastinal contours and pulmonary vascularity are normal.  The bones and soft  tissues are within normal limits.      IMPRESSION  IMPRESSION: Normal single view chest.                     EXAM:  XR HIP RT W OR WO PELV 2-3 VWS     INDICATION:   Trauma.     COMPARISON: None.     FINDINGS: An AP view of the pelvis and a frogleg lateral view of the right hip  demonstrate slightly displaced 3 part inter and subtrochanteric fracture of the  right femur. The femoral head is located.  Pelvic ring appears intact     IMPRESSION  IMPRESSION:  Right hip fracture.                  EKG interpretation: (Preliminary) 1248  Rhythm: normal sinus rhythm; and regular . Rate (approx.): 60; Axis: normal; P wave: normal; QRS interval: normal ; ST/T wave: normal;   Written by Liliane Funez, SHANTEL Scribe, as dictated by Simon Carrillo. Callie Doty, [address]:     CONSULT NOTE:   2:10 PM  Dhiraj Doty MD spoke with Dr. Awa Brennan,   Specialty: Hospitalist  Discussed pt's hx, disposition, and available diagnostic and imaging results. Reviewed care plans. Consultant will evaluate pt for admission. Written by Liliane Funez, SHANTEL Scribe, as dictated by Simon Doty MD.    PROGRESS NOTES:    Progress Note:  2:05 PM  Pt requesting something more for pain. Written by Liliane Funez ED Scribe, as dictated by Simon Doty MD.    DIAGNOSIS:    1. Hip fracture, right, closed, initial encounter ([facility] [address]    2. Fall, initial encounter      PLAN:  Admit to Hospitalist.    2:10 PM  Patient is being admitted to the hospital.  The results of their tests and reasons for their admission have been discussed with them and/or available family. They convey agreement and understanding for the need to be admitted and for their admission diagnosis. Consultation has been made with the inpatient physician specialist for hospitalization. This note is prepared by Liliane Funez, acting as Scribe for Simon Carrillo. Callie Doty, [address] Callie Doty MD: The scribe's documentation has been prepared under my direction and personally reviewed by me in its entirety. I confirm that the note above accurately reflects all work, treatment, procedures, and medical decision making performed by me.

## 2017-10-01 ENCOUNTER — APPOINTMENT (OUTPATIENT)
Dept: GENERAL RADIOLOGY | Age: 72
DRG: 482 | End: 2017-10-01
Attending: ORTHOPAEDIC SURGERY
Payer: MEDICARE

## 2017-10-01 ENCOUNTER — ANESTHESIA EVENT (OUTPATIENT)
Dept: SURGERY | Age: 72
DRG: 482 | End: 2017-10-01
Payer: MEDICARE

## 2017-10-01 ENCOUNTER — ANESTHESIA (OUTPATIENT)
Dept: SURGERY | Age: 72
DRG: 482 | End: 2017-10-01
Payer: MEDICARE

## 2017-10-01 LAB
ALBUMIN SERPL-MCNC: 2.7 G/DL (ref 3.5–5)
ALBUMIN/GLOB SERPL: 0.8 {RATIO} (ref 1.1–2.2)
ALP SERPL-CCNC: 58 U/L (ref 45–117)
ALT SERPL-CCNC: 18 U/L (ref 12–78)
ANION GAP SERPL CALC-SCNC: 6 MMOL/L (ref 5–15)
AST SERPL-CCNC: 10 U/L (ref 15–37)
ATRIAL RATE: 60 BPM
BASOPHILS # BLD: 0 K/UL (ref 0–0.1)
BASOPHILS NFR BLD: 0 % (ref 0–1)
BILIRUB SERPL-MCNC: 0.4 MG/DL (ref 0.2–1)
BUN SERPL-MCNC: 13 MG/DL (ref 6–20)
BUN/CREAT SERPL: 20 (ref 12–20)
CALCIUM SERPL-MCNC: 7.5 MG/DL (ref 8.5–10.1)
CALCULATED P AXIS, ECG09: 29 DEGREES
CALCULATED R AXIS, ECG10: 12 DEGREES
CALCULATED T AXIS, ECG11: 31 DEGREES
CHLORIDE SERPL-SCNC: 108 MMOL/L (ref 97–108)
CO2 SERPL-SCNC: 24 MMOL/L (ref 21–32)
CREAT SERPL-MCNC: 0.64 MG/DL (ref 0.55–1.02)
DIAGNOSIS, 93000: NORMAL
EOSINOPHIL # BLD: 0 K/UL (ref 0–0.4)
EOSINOPHIL NFR BLD: 0 % (ref 0–7)
ERYTHROCYTE [DISTWIDTH] IN BLOOD BY AUTOMATED COUNT: 13.8 % (ref 11.5–14.5)
EST. AVERAGE GLUCOSE BLD GHB EST-MCNC: 103 MG/DL
GLOBULIN SER CALC-MCNC: 3.2 G/DL (ref 2–4)
GLUCOSE SERPL-MCNC: 117 MG/DL (ref 65–100)
HBA1C MFR BLD: 5.2 % (ref 4.2–6.3)
HCT VFR BLD AUTO: 27.1 % (ref 35–47)
HGB BLD-MCNC: 9 G/DL (ref 11.5–16)
LYMPHOCYTES # BLD: 1.5 K/UL (ref 0.8–3.5)
LYMPHOCYTES NFR BLD: 21 % (ref 12–49)
MAGNESIUM SERPL-MCNC: 2.1 MG/DL (ref 1.6–2.4)
MCH RBC QN AUTO: 31 PG (ref 26–34)
MCHC RBC AUTO-ENTMCNC: 33.2 G/DL (ref 30–36.5)
MCV RBC AUTO: 93.4 FL (ref 80–99)
MONOCYTES # BLD: 0.9 K/UL (ref 0–1)
MONOCYTES NFR BLD: 13 % (ref 5–13)
NEUTS SEG # BLD: 4.7 K/UL (ref 1.8–8)
NEUTS SEG NFR BLD: 66 % (ref 32–75)
P-R INTERVAL, ECG05: 126 MS
PLATELET # BLD AUTO: 135 K/UL (ref 150–400)
POTASSIUM SERPL-SCNC: 4.3 MMOL/L (ref 3.5–5.1)
PROT SERPL-MCNC: 5.9 G/DL (ref 6.4–8.2)
Q-T INTERVAL, ECG07: 444 MS
QRS DURATION, ECG06: 86 MS
QTC CALCULATION (BEZET), ECG08: 444 MS
RBC # BLD AUTO: 2.9 M/UL (ref 3.8–5.2)
SODIUM SERPL-SCNC: 138 MMOL/L (ref 136–145)
TSH SERPL DL<=0.05 MIU/L-ACNC: 3.22 UIU/ML (ref 0.36–3.74)
VENTRICULAR RATE, ECG03: 60 BPM
WBC # BLD AUTO: 7.2 K/UL (ref 3.6–11)

## 2017-10-01 PROCEDURE — 77030020788: Performed by: ORTHOPAEDIC SURGERY

## 2017-10-01 PROCEDURE — 74011250636 HC RX REV CODE- 250/636

## 2017-10-01 PROCEDURE — 77030018836 HC SOL IRR NACL ICUM -A: Performed by: ORTHOPAEDIC SURGERY

## 2017-10-01 PROCEDURE — 74011250636 HC RX REV CODE- 250/636: Performed by: NURSE PRACTITIONER

## 2017-10-01 PROCEDURE — 76060000033 HC ANESTHESIA 1 TO 1.5 HR: Performed by: ORTHOPAEDIC SURGERY

## 2017-10-01 PROCEDURE — 77030031139 HC SUT VCRL2 J&J -A: Performed by: ORTHOPAEDIC SURGERY

## 2017-10-01 PROCEDURE — 74011250636 HC RX REV CODE- 250/636: Performed by: ANESTHESIOLOGY

## 2017-10-01 PROCEDURE — 76210000016 HC OR PH I REC 1 TO 1.5 HR: Performed by: ORTHOPAEDIC SURGERY

## 2017-10-01 PROCEDURE — 8E0YXBF COMPUTER ASSISTED PROCEDURE OF LOWER EXTREMITY, WITH FLUOROSCOPY: ICD-10-PCS | Performed by: ORTHOPAEDIC SURGERY

## 2017-10-01 PROCEDURE — 0QS636Z REPOSITION RIGHT UPPER FEMUR WITH INTRAMEDULLARY INTERNAL FIXATION DEVICE, PERCUTANEOUS APPROACH: ICD-10-PCS | Performed by: ORTHOPAEDIC SURGERY

## 2017-10-01 PROCEDURE — 83735 ASSAY OF MAGNESIUM: CPT | Performed by: INTERNAL MEDICINE

## 2017-10-01 PROCEDURE — 77030008467 HC STPLR SKN COVD -B: Performed by: ORTHOPAEDIC SURGERY

## 2017-10-01 PROCEDURE — 84443 ASSAY THYROID STIM HORMONE: CPT | Performed by: INTERNAL MEDICINE

## 2017-10-01 PROCEDURE — C1713 ANCHOR/SCREW BN/BN,TIS/BN: HCPCS | Performed by: ORTHOPAEDIC SURGERY

## 2017-10-01 PROCEDURE — 80053 COMPREHEN METABOLIC PANEL: CPT | Performed by: INTERNAL MEDICINE

## 2017-10-01 PROCEDURE — 73501 X-RAY EXAM HIP UNI 1 VIEW: CPT

## 2017-10-01 PROCEDURE — 74011250637 HC RX REV CODE- 250/637: Performed by: NURSE PRACTITIONER

## 2017-10-01 PROCEDURE — 74011000258 HC RX REV CODE- 258: Performed by: ORTHOPAEDIC SURGERY

## 2017-10-01 PROCEDURE — 76000 FLUOROSCOPY <1 HR PHYS/QHP: CPT

## 2017-10-01 PROCEDURE — 77030014405 HC GD ROD RMR SYNT -C: Performed by: ORTHOPAEDIC SURGERY

## 2017-10-01 PROCEDURE — 77030026438 HC STYL ET INTUB CARD -A: Performed by: ANESTHESIOLOGY

## 2017-10-01 PROCEDURE — 77030032490 HC SLV COMPR SCD KNE COVD -B

## 2017-10-01 PROCEDURE — 74011250637 HC RX REV CODE- 250/637: Performed by: INTERNAL MEDICINE

## 2017-10-01 PROCEDURE — 77030008684 HC TU ET CUF COVD -B: Performed by: ANESTHESIOLOGY

## 2017-10-01 PROCEDURE — 77030013079 HC BLNKT BAIR HGGR 3M -A: Performed by: ANESTHESIOLOGY

## 2017-10-01 PROCEDURE — 83036 HEMOGLOBIN GLYCOSYLATED A1C: CPT | Performed by: INTERNAL MEDICINE

## 2017-10-01 PROCEDURE — 74011250637 HC RX REV CODE- 250/637: Performed by: ORTHOPAEDIC SURGERY

## 2017-10-01 PROCEDURE — 85025 COMPLETE CBC W/AUTO DIFF WBC: CPT | Performed by: INTERNAL MEDICINE

## 2017-10-01 PROCEDURE — 74011250636 HC RX REV CODE- 250/636: Performed by: INTERNAL MEDICINE

## 2017-10-01 PROCEDURE — 76010000149 HC OR TIME 1 TO 1.5 HR: Performed by: ORTHOPAEDIC SURGERY

## 2017-10-01 PROCEDURE — C1769 GUIDE WIRE: HCPCS | Performed by: ORTHOPAEDIC SURGERY

## 2017-10-01 PROCEDURE — 74011000250 HC RX REV CODE- 250

## 2017-10-01 PROCEDURE — 65660000000 HC RM CCU STEPDOWN

## 2017-10-01 PROCEDURE — 74011000250 HC RX REV CODE- 250: Performed by: ORTHOPAEDIC SURGERY

## 2017-10-01 PROCEDURE — 36415 COLL VENOUS BLD VENIPUNCTURE: CPT | Performed by: INTERNAL MEDICINE

## 2017-10-01 PROCEDURE — 77030016474 HC BIT DRL QC3 SYNT -C: Performed by: ORTHOPAEDIC SURGERY

## 2017-10-01 DEVICE — SCREW BNE L36MM DIA5MM NONSTERILE TIB LT GRN TI ST CANN LOK: Type: IMPLANTABLE DEVICE | Site: FEMUR | Status: FUNCTIONAL

## 2017-10-01 RX ORDER — HYDROMORPHONE HYDROCHLORIDE 1 MG/ML
0.5 INJECTION, SOLUTION INTRAMUSCULAR; INTRAVENOUS; SUBCUTANEOUS
Status: DISCONTINUED | OUTPATIENT
Start: 2017-10-01 | End: 2017-10-03 | Stop reason: HOSPADM

## 2017-10-01 RX ORDER — POLYETHYLENE GLYCOL 3350 17 G/17G
17 POWDER, FOR SOLUTION ORAL DAILY
Status: DISCONTINUED | OUTPATIENT
Start: 2017-10-01 | End: 2017-10-03 | Stop reason: HOSPADM

## 2017-10-01 RX ORDER — ROCURONIUM BROMIDE 10 MG/ML
INJECTION, SOLUTION INTRAVENOUS AS NEEDED
Status: DISCONTINUED | OUTPATIENT
Start: 2017-10-01 | End: 2017-10-01 | Stop reason: HOSPADM

## 2017-10-01 RX ORDER — ACETAMINOPHEN 10 MG/ML
INJECTION, SOLUTION INTRAVENOUS AS NEEDED
Status: DISCONTINUED | OUTPATIENT
Start: 2017-10-01 | End: 2017-10-01 | Stop reason: HOSPADM

## 2017-10-01 RX ORDER — TRANEXAMIC ACID 100 MG/ML
INJECTION, SOLUTION INTRAVENOUS
Status: DISPENSED
Start: 2017-10-01 | End: 2017-10-01

## 2017-10-01 RX ORDER — PROPOFOL 10 MG/ML
INJECTION, EMULSION INTRAVENOUS AS NEEDED
Status: DISCONTINUED | OUTPATIENT
Start: 2017-10-01 | End: 2017-10-01 | Stop reason: HOSPADM

## 2017-10-01 RX ORDER — DIPHENHYDRAMINE HYDROCHLORIDE 50 MG/ML
12.5 INJECTION, SOLUTION INTRAMUSCULAR; INTRAVENOUS AS NEEDED
Status: DISCONTINUED | OUTPATIENT
Start: 2017-10-01 | End: 2017-10-01 | Stop reason: HOSPADM

## 2017-10-01 RX ORDER — MIDAZOLAM HYDROCHLORIDE 1 MG/ML
INJECTION, SOLUTION INTRAMUSCULAR; INTRAVENOUS AS NEEDED
Status: DISCONTINUED | OUTPATIENT
Start: 2017-10-01 | End: 2017-10-01 | Stop reason: HOSPADM

## 2017-10-01 RX ORDER — SODIUM CHLORIDE 0.9 % (FLUSH) 0.9 %
5-10 SYRINGE (ML) INJECTION AS NEEDED
Status: DISCONTINUED | OUTPATIENT
Start: 2017-10-01 | End: 2017-10-01 | Stop reason: HOSPADM

## 2017-10-01 RX ORDER — SODIUM CHLORIDE 9 MG/ML
INJECTION, SOLUTION INTRAVENOUS
Status: DISPENSED
Start: 2017-10-01 | End: 2017-10-01

## 2017-10-01 RX ORDER — SODIUM CHLORIDE, SODIUM LACTATE, POTASSIUM CHLORIDE, CALCIUM CHLORIDE 600; 310; 30; 20 MG/100ML; MG/100ML; MG/100ML; MG/100ML
25 INJECTION, SOLUTION INTRAVENOUS CONTINUOUS
Status: DISCONTINUED | OUTPATIENT
Start: 2017-10-01 | End: 2017-10-01 | Stop reason: HOSPADM

## 2017-10-01 RX ORDER — SODIUM CHLORIDE 0.9 % (FLUSH) 0.9 %
5-10 SYRINGE (ML) INJECTION EVERY 8 HOURS
Status: DISCONTINUED | OUTPATIENT
Start: 2017-10-01 | End: 2017-10-01 | Stop reason: HOSPADM

## 2017-10-01 RX ORDER — GLYCOPYRROLATE 0.2 MG/ML
INJECTION INTRAMUSCULAR; INTRAVENOUS AS NEEDED
Status: DISCONTINUED | OUTPATIENT
Start: 2017-10-01 | End: 2017-10-01 | Stop reason: HOSPADM

## 2017-10-01 RX ORDER — LIDOCAINE HYDROCHLORIDE 10 MG/ML
0.1 INJECTION, SOLUTION EPIDURAL; INFILTRATION; INTRACAUDAL; PERINEURAL AS NEEDED
Status: DISCONTINUED | OUTPATIENT
Start: 2017-10-01 | End: 2017-10-01 | Stop reason: HOSPADM

## 2017-10-01 RX ORDER — ONDANSETRON 2 MG/ML
INJECTION INTRAMUSCULAR; INTRAVENOUS AS NEEDED
Status: DISCONTINUED | OUTPATIENT
Start: 2017-10-01 | End: 2017-10-01 | Stop reason: HOSPADM

## 2017-10-01 RX ORDER — CYCLOBENZAPRINE HCL 10 MG
10 TABLET ORAL
Status: DISCONTINUED | OUTPATIENT
Start: 2017-10-01 | End: 2017-10-03 | Stop reason: HOSPADM

## 2017-10-01 RX ORDER — TRAZODONE HYDROCHLORIDE 50 MG/1
25 TABLET ORAL
Status: DISCONTINUED | OUTPATIENT
Start: 2017-10-01 | End: 2017-10-02

## 2017-10-01 RX ORDER — PHENYLEPHRINE HYDROCHLORIDE 10 MG/ML
INJECTION INTRAVENOUS AS NEEDED
Status: DISCONTINUED | OUTPATIENT
Start: 2017-10-01 | End: 2017-10-01 | Stop reason: HOSPADM

## 2017-10-01 RX ORDER — DEXAMETHASONE SODIUM PHOSPHATE 4 MG/ML
INJECTION, SOLUTION INTRA-ARTICULAR; INTRALESIONAL; INTRAMUSCULAR; INTRAVENOUS; SOFT TISSUE AS NEEDED
Status: DISCONTINUED | OUTPATIENT
Start: 2017-10-01 | End: 2017-10-01 | Stop reason: HOSPADM

## 2017-10-01 RX ORDER — CEFAZOLIN SODIUM IN 0.9 % NACL 2 G/100 ML
2 PLASTIC BAG, INJECTION (ML) INTRAVENOUS
Status: COMPLETED | OUTPATIENT
Start: 2017-10-01 | End: 2017-10-01

## 2017-10-01 RX ORDER — FENTANYL CITRATE 50 UG/ML
25 INJECTION, SOLUTION INTRAMUSCULAR; INTRAVENOUS
Status: DISCONTINUED | OUTPATIENT
Start: 2017-10-01 | End: 2017-10-01 | Stop reason: HOSPADM

## 2017-10-01 RX ORDER — SUCCINYLCHOLINE CHLORIDE 20 MG/ML
INJECTION INTRAMUSCULAR; INTRAVENOUS AS NEEDED
Status: DISCONTINUED | OUTPATIENT
Start: 2017-10-01 | End: 2017-10-01 | Stop reason: HOSPADM

## 2017-10-01 RX ORDER — LIDOCAINE HYDROCHLORIDE 20 MG/ML
INJECTION, SOLUTION EPIDURAL; INFILTRATION; INTRACAUDAL; PERINEURAL AS NEEDED
Status: DISCONTINUED | OUTPATIENT
Start: 2017-10-01 | End: 2017-10-01 | Stop reason: HOSPADM

## 2017-10-01 RX ORDER — FENTANYL CITRATE 50 UG/ML
INJECTION, SOLUTION INTRAMUSCULAR; INTRAVENOUS AS NEEDED
Status: DISCONTINUED | OUTPATIENT
Start: 2017-10-01 | End: 2017-10-01 | Stop reason: HOSPADM

## 2017-10-01 RX ORDER — HYDROMORPHONE HYDROCHLORIDE 1 MG/ML
0.2 INJECTION, SOLUTION INTRAMUSCULAR; INTRAVENOUS; SUBCUTANEOUS
Status: DISCONTINUED | OUTPATIENT
Start: 2017-10-01 | End: 2017-10-01 | Stop reason: HOSPADM

## 2017-10-01 RX ORDER — OXYCODONE HYDROCHLORIDE 5 MG/1
2.5 TABLET ORAL
Status: DISCONTINUED | OUTPATIENT
Start: 2017-10-01 | End: 2017-10-02

## 2017-10-01 RX ORDER — NEOSTIGMINE METHYLSULFATE 1 MG/ML
INJECTION INTRAVENOUS AS NEEDED
Status: DISCONTINUED | OUTPATIENT
Start: 2017-10-01 | End: 2017-10-01 | Stop reason: HOSPADM

## 2017-10-01 RX ADMIN — ONDANSETRON 4 MG: 2 INJECTION INTRAMUSCULAR; INTRAVENOUS at 08:59

## 2017-10-01 RX ADMIN — DEXAMETHASONE SODIUM PHOSPHATE 8 MG: 4 INJECTION, SOLUTION INTRA-ARTICULAR; INTRALESIONAL; INTRAMUSCULAR; INTRAVENOUS; SOFT TISSUE at 08:27

## 2017-10-01 RX ADMIN — LIDOCAINE HYDROCHLORIDE 60 MG: 20 INJECTION, SOLUTION EPIDURAL; INFILTRATION; INTRACAUDAL; PERINEURAL at 08:07

## 2017-10-01 RX ADMIN — CEFAZOLIN 2 G: 10 INJECTION, POWDER, FOR SOLUTION INTRAVENOUS; PARENTERAL at 08:09

## 2017-10-01 RX ADMIN — TRANEXAMIC ACID 1000 MG: 100 INJECTION, SOLUTION INTRAVENOUS at 10:23

## 2017-10-01 RX ADMIN — ACETAMINOPHEN 650 MG: 325 TABLET ORAL at 16:14

## 2017-10-01 RX ADMIN — PHENYLEPHRINE HYDROCHLORIDE 80 MCG: 10 INJECTION INTRAVENOUS at 08:12

## 2017-10-01 RX ADMIN — ROCURONIUM BROMIDE 15 MG: 10 INJECTION, SOLUTION INTRAVENOUS at 08:13

## 2017-10-01 RX ADMIN — HYDROMORPHONE HYDROCHLORIDE 1 MG: 1 INJECTION, SOLUTION INTRAMUSCULAR; INTRAVENOUS; SUBCUTANEOUS at 03:39

## 2017-10-01 RX ADMIN — FENTANYL CITRATE 25 MCG: 50 INJECTION, SOLUTION INTRAMUSCULAR; INTRAVENOUS at 08:15

## 2017-10-01 RX ADMIN — ONDANSETRON 4 MG: 2 INJECTION INTRAMUSCULAR; INTRAVENOUS at 06:23

## 2017-10-01 RX ADMIN — Medication 10 ML: at 11:47

## 2017-10-01 RX ADMIN — SODIUM CHLORIDE, SODIUM LACTATE, POTASSIUM CHLORIDE, AND CALCIUM CHLORIDE 25 ML/HR: 600; 310; 30; 20 INJECTION, SOLUTION INTRAVENOUS at 07:20

## 2017-10-01 RX ADMIN — OXYCODONE HYDROCHLORIDE 2.5 MG: 5 TABLET ORAL at 20:47

## 2017-10-01 RX ADMIN — OXYCODONE HYDROCHLORIDE 10 MG: 5 TABLET ORAL at 01:37

## 2017-10-01 RX ADMIN — ONDANSETRON 4 MG: 2 INJECTION INTRAMUSCULAR; INTRAVENOUS at 11:46

## 2017-10-01 RX ADMIN — Medication 10 ML: at 16:18

## 2017-10-01 RX ADMIN — ACETAMINOPHEN 1000 MG: 10 INJECTION, SOLUTION INTRAVENOUS at 08:35

## 2017-10-01 RX ADMIN — ACETAMINOPHEN 650 MG: 325 TABLET ORAL at 20:46

## 2017-10-01 RX ADMIN — CYCLOBENZAPRINE HYDROCHLORIDE 10 MG: 10 TABLET, FILM COATED ORAL at 17:07

## 2017-10-01 RX ADMIN — ROCURONIUM BROMIDE 5 MG: 10 INJECTION, SOLUTION INTRAVENOUS at 08:07

## 2017-10-01 RX ADMIN — NEOSTIGMINE METHYLSULFATE 2 MG: 1 INJECTION INTRAVENOUS at 09:02

## 2017-10-01 RX ADMIN — SODIUM CHLORIDE 75 ML/HR: 900 INJECTION, SOLUTION INTRAVENOUS at 17:08

## 2017-10-01 RX ADMIN — FENTANYL CITRATE 50 MCG: 50 INJECTION, SOLUTION INTRAMUSCULAR; INTRAVENOUS at 08:07

## 2017-10-01 RX ADMIN — PROPOFOL 100 MG: 10 INJECTION, EMULSION INTRAVENOUS at 08:07

## 2017-10-01 RX ADMIN — GLYCOPYRROLATE 0.4 MG: 0.2 INJECTION INTRAMUSCULAR; INTRAVENOUS at 09:02

## 2017-10-01 RX ADMIN — PHENYLEPHRINE HYDROCHLORIDE 160 MCG: 10 INJECTION INTRAVENOUS at 08:14

## 2017-10-01 RX ADMIN — SODIUM CHLORIDE, SODIUM LACTATE, POTASSIUM CHLORIDE, AND CALCIUM CHLORIDE: 600; 310; 30; 20 INJECTION, SOLUTION INTRAVENOUS at 09:00

## 2017-10-01 RX ADMIN — HYDROMORPHONE HYDROCHLORIDE 0.5 MG: 1 INJECTION, SOLUTION INTRAMUSCULAR; INTRAVENOUS; SUBCUTANEOUS at 11:45

## 2017-10-01 RX ADMIN — PHENYLEPHRINE HYDROCHLORIDE 160 MCG: 10 INJECTION INTRAVENOUS at 08:10

## 2017-10-01 RX ADMIN — HYDROMORPHONE HYDROCHLORIDE 1 MG: 1 INJECTION, SOLUTION INTRAMUSCULAR; INTRAVENOUS; SUBCUTANEOUS at 06:19

## 2017-10-01 RX ADMIN — OXYCODONE HYDROCHLORIDE 10 MG: 5 TABLET ORAL at 05:48

## 2017-10-01 RX ADMIN — MIDAZOLAM HYDROCHLORIDE 2 MG: 1 INJECTION, SOLUTION INTRAMUSCULAR; INTRAVENOUS at 08:00

## 2017-10-01 RX ADMIN — SUCCINYLCHOLINE CHLORIDE 120 MG: 20 INJECTION INTRAMUSCULAR; INTRAVENOUS at 08:07

## 2017-10-01 NOTE — ANESTHESIA PREPROCEDURE EVALUATION
Anesthetic History   No history of anesthetic complications            Review of Systems / Medical History  Patient summary reviewed, nursing notes reviewed and pertinent labs reviewed    Pulmonary  Within defined limits                 Neuro/Psych   Within defined limits           Cardiovascular  Within defined limits                Exercise tolerance: >4 METS     GI/Hepatic/Renal  Within defined limits              Endo/Other        Anemia     Other Findings   Comments: Right Hip Fracture         Physical Exam    Airway  Mallampati: II  TM Distance: 4 - 6 cm  Neck ROM: normal range of motion   Mouth opening: Normal     Cardiovascular  Regular rate and rhythm,  S1 and S2 normal,  no murmur, click, rub, or gallop             Dental  No notable dental hx       Pulmonary  Breath sounds clear to auscultation               Abdominal  GI exam deferred       Other Findings            Anesthetic Plan    ASA: 2  Anesthesia type: general    Monitoring Plan: BIS      Induction: Intravenous  Anesthetic plan and risks discussed with: Patient

## 2017-10-01 NOTE — PROGRESS NOTES
1611 Patient complaining of muscle spasms. POD 0 right femur insertion intramedullary nail. Paging On call Alysha Jarrett to [phone]. [phone]. [phone] Received a telephone order from Gearldine Cast PA Flexeril 10 mg TID PRN.

## 2017-10-01 NOTE — PERIOP NOTES
TRANSFER - OUT REPORT:    Verbal report given to Kandy Smart RN(name) on Dev Pierce  being transferred to [facility](unit) for routine post - op       Report consisted of patients Situation, Background, Assessment and   Recommendations(SBAR). Information from the following report(s) SBAR, Kardex, ED Summary, OR Summary, Procedure Summary, Intake/Output, MAR, Accordion, Recent Results, Med Rec Status, Cardiac Rhythm NSR and Alarm Parameters  was reviewed with the receiving nurse. Lines:   Peripheral IV 09/30/17 Left Antecubital (Active)   Site Assessment Clean, dry, & intact 10/1/2017  9:15 AM   Phlebitis Assessment 0 10/1/2017  9:15 AM   Infiltration Assessment 0 10/1/2017  9:15 AM   Dressing Status Clean, dry, & intact 10/1/2017  9:15 AM   Dressing Type Tape;Transparent 10/1/2017  9:15 AM   Hub Color/Line Status Patent;Pink;Positional;Infusing 10/1/2017  9:15 AM        Opportunity for questions and clarification was provided. Patient transported with:   Monitor  O2 @ 2 liters  Registered Nurse    [address] [place] [address] Dr. Stanislav Quinonez and confirmed that he would like to have another 1 gram of TXA piggyback infusion provided to the patient prior to transport.

## 2017-10-01 NOTE — PERIOP NOTES
TSBAR, ED Summary, Procedure Summary, MAR and Cardiac Rhythm NSR TRANSFER - IN REPORT:    Verbal report received from [address] on [facility] Blood  being received from 3218(unit) for ordered procedure      Report consisted of patients Situation, Background, Assessment and   Recommendations(SBAR). Information from the following report(nsrSBAR, ED Summary, Procedure Summary, MAR and Cardiac Rhythm nsr was reviewed with the receiving nurse. Opportunity for questions and clarification was provided. Assessment completed upon patients arrival to unit and care assumed.

## 2017-10-01 NOTE — ROUTINE PROCESS
Bedside and Verbal shift change report given to [address] (oncoming nurse) by David Costa RN (offgoing nurse). Report included the following information SBAR, Kardex, ED Summary, Intake/Output, MAR, Accordion, Recent Results, Med Rec Status and Cardiac Rhythm NSR.

## 2017-10-01 NOTE — PROGRESS NOTES
Hospitalist Progress Note    NAME: Barb Interiano   :  1945   MRN:  [id number]       Assessment / Plan:  Right hip fracture  S/p right femur insertion intramedullary nail. No acute issues, pain controlled. Advance diet as tolerated. Body mass index is 24.96 kg/(m^2). Code status: Full  DVT prophylaxis: as per ortho/surgeon    Dispo: Inpatient Rehab or SNF (followup with surgeon/PT/CM)     Subjective:     Chief Complaint / Reason for Physician Visit  No complaints    Review of Systems:  Symptom Y/N Comments  Symptom Y/N Comments   Fever/Chills n   Chest Pain n    Poor Appetite    Edema     Cough n   Abdominal Pain n    Sputum    Joint Pain     SOB/ALBERTO n   Pruritis/Rash     Nausea/vomit n   Tolerating PT/OT     Diarrhea    Tolerating Diet     Constipation    Other       Could NOT obtain due to:      Objective:     VITALS:   Last 24hrs VS reviewed since prior progress note.  Most recent are:  Patient Vitals for the past 24 hrs:   Temp Pulse Resp BP SpO2   10/01/17 1050 97.6 °F (36.4 °C) 69 12 116/62 100 %   10/01/17 1030 97.6 °F (36.4 °C) 67 10 108/57 100 %   10/01/17 1015 - 68 9 107/56 99 %   10/01/17 1000 - 71 16 114/54 99 %   10/01/17 0945 96.1 °F (35.6 °C) 79 8 110/56 96 %   10/01/17 0940 - 73 11 117/52 100 %   10/01/17 0935 - 74 9 118/56 100 %   10/01/17 0930 - 76 10 114/56 100 %   10/01/17 0925 - 80 11 114/54 100 %   10/01/17 0920 - 79 11 - 100 %   10/01/17 0916 97.8 °F (36.6 °C) 80 11 119/57 100 %   10/01/17 0915  80 (!) 7 119/57 100 %   10/01/17 0913 97.8 °F (36.6 °C) 80 8 120/56 100 %   10/01/17 0735  17 - 100 %   10/01/17 0730 - 74 15 121/55 97 %   10/01/17 0725 - 68 10 121/59 97 %   10/01/17 0720 - 73 13 131/61 94 %   10/01/17 0715 98 °F (36.7 °C) - - 112/57 -   10/01/17 0700 97.9 °F (36.6 °C) 67 18 103/55 98 %   10/01/17 0400 98.1 °F (36.7 °C) 68 18 116/50 94 %   17 2313 98.5 °F (36.9 °C) 65 18 114/58 95 %   17 2005 97.5 °F (36.4 °C) 69 18 128/64 100 %   17 1830 - 62 16 107/59 96 %   09/30/17 1800 - 65 15 107/60 100 %   09/30/17 1745 - 64 13 103/59 99 %   09/30/17 1730 - 64 12 99/56 97 %   09/30/17 1715 - 68 16 96/56 92 %   09/30/17 1700 - 66 10 98/54 99 %   09/30/17 1645 - 71 17 107/60 97 %   09/30/17 1630 - 71 14 102/51 93 %   09/30/17 1600 - 76 13 99/48 97 %   09/30/17 1530 - 62 13 134/68 98 %   09/30/17 1515 - (!) 59 11 155/81 100 %   09/30/17 1445 - 69 14 168/87 99 %   09/30/17 1430 - 66 16 130/63 96 %   09/30/17 1415 - 69 17 123/76 98 %   09/30/17 1402 - 69 18 139/80 100 %   09/30/17 1330 - 66 17 148/75 97 %   09/30/17 1315 - (!) 59 16 152/73 97 %   09/30/17 1310 - 61 13 146/74 99 %   09/30/17 1231 97.6 °F (36.4 °C) 62 18 (!) 185/106 98 %         PHYSICAL EXAM:  General: Alert, cooperative, no acute distress    EENT:  Anicteric sclerae. Resp:  CTA bilaterally, no wheezing or rales. No accessory muscle use  CV:  Regular  rhythm,  No edema  GI:  Soft, Non distended, Non tender.  +Bowel sounds  Neurologic:  Alert and oriented X 3, normal speech,   Psych:   Good insight. Not anxious nor agitated  Skin:  No rashes. No jaundice      ________________________________________________________________________  Care Plan discussed with:    Comments   Patient y    Family  y    RN     Care Manager     Consultant                        Multidiciplinary team rounds were held today with , nursing, pharmacist and clinical coordinator. Patient's plan of care was discussed; medications were reviewed and discharge planning was addressed.      ________________________________________________________________________  Total NON critical care TIME:  30 Minutes    Total CRITICAL CARE TIME Spent:   Minutes non procedure based      Comments   >50% of visit spent in counseling and coordination of care     ________________________________________________________________________  MD Grady Colin      10/01/17   0410  09/30/17   1312   WBC  7.2  7.1   HGB  9.0* 10.9*   HCT  27.1*  31.9*   PLT  135*  153     Recent Labs      10/01/17   0410  09/30/17   1408  09/30/17   1312   NA  138  140   --    K  4.3  3.9   --    CL  108  109*   --    CO2  24  24   --    GLU  117*  112*   --    BUN  13  16   --    CREA  0.64  0.73   --    CA  7.5*  8.2*   --    MG  2.1  2.1   --    ALB  2.7*  3.4*   --    TBILI  0.4  0.5   --    SGOT  10*  15   --    ALT  18  21   --    INR   --    --   1.1       Signed: Beverly Harris MD

## 2017-10-01 NOTE — ANESTHESIA POSTPROCEDURE EVALUATION
Post-Anesthesia Evaluation and Assessment    Patient: Dev Pierce MRN: [id number]  SSN: [id number]    YOB: 1945  Age: 71 y.o. Sex: female       Cardiovascular Function/Vital Signs  Visit Vitals    /56 (BP 1 Location: Right arm, BP Patient Position: At rest)    Pulse 79    Temp 35.6 °C (96.1 °F)    Resp 8    Ht 5' 7\" (1.702 m)    Wt 72.3 kg (159 lb 6.3 oz)    SpO2 96%    BMI 24.96 kg/m2       Patient is status post general anesthesia for Procedure(s):  RIGHT FEMUR INSERTION INTRA MEDULLARY NAIL. Nausea/Vomiting: None    Postoperative hydration reviewed and adequate. Pain:  Pain Scale 1: Numeric (0 - 10) (10/01/17 0945)  Pain Intensity 1: 0 (10/01/17 0945)   Managed    Neurological Status:   Neuro (WDL): Exceptions to WDL (10/01/17 0915)  Neuro  Neurologic State: Drowsy; Eyes open to voice (10/01/17 0915)  Orientation Level: Oriented to person (10/01/17 0915)  Cognition: Follows commands (10/01/17 0915)  Speech: Clear (10/01/17 0915)  LUE Motor Response: Purposeful (10/01/17 0915)  LLE Motor Response: Purposeful (10/01/17 0915)  RUE Motor Response: Purposeful (10/01/17 0915)  RLE Motor Response: Purposeful (10/01/17 0915)   At baseline    Mental Status and Level of Consciousness: Arousable    Pulmonary Status:   O2 Device: Nasal cannula (10/01/17 0945)   Adequate oxygenation and airway patent    Complications related to anesthesia: None    Post-anesthesia assessment completed.  No concerns    Signed By: Ozella Koyanagi, MD     October 1, 2017

## 2017-10-01 NOTE — PERIOP NOTES
Handoff Report from Operating Room to PACU    Report received from Nereida Silva and [address] [place] [address] regarding Barb Interiano. Surgeon(s):  Diedra Osgood, MD  And Procedure(s) (LRB):  RIGHT FEMUR INSERTION INTRA MEDULLARY NAIL (Right)  confirmed   with allergies and dressings discussed. Anesthesia type, drugs, patient history, complications, estimated blood loss, vital signs, intake and output, and last pain medication and reversal medications were reviewed.

## 2017-10-01 NOTE — OP NOTES
Kev Marshall MD - Adult Reconstruction and Total Joint Replacement    Phoebe Mercado - MRN [id number] - : 1945 (71 y.o.)      Date: 10/1/2017    PREOPERATIVE DIAGNOSIS: Right comminuted and displaced per/subtrochanteric hip fracture    POSTOPERATIVE DIAGNOSIS: same    OPERATIVE PROCEDURE: Intramedullary nail fixation of  hip fracture    IMPLANTS USED:   Implant Name Type Inv. Item Serial No.  Lot No. LRB No. Used Action   11MM/130 DEG TI MELISSA TFNA 235MM RIGHT   NA [employer] [place] [id number] Right 1 Implanted   TFNA SCREW 100MM   NA [employer] [place] [id number] Right 1 Implanted   SCR BNE LCK T25 F/IM NL [id number] --  - SNA   SCR BNE LCK T25 F/IM NL [id number] --  NA SYNTHES [place] NA Right 1 Implanted       SURGEON: Kev Marshall MD    ASSISTANT: none    ANESTHESIA: General    ESTIMATED BLOOD LOSS: 100. DRAINS: None. SPECIMENS: None. COMPLICATIONS: None. CONDITION: Stable to PACU. INDICATIONS: Intertrochanteric hip fracture. The risks, benefits, and alternatives to IM nail fixation were explained to the patient and family and they wished to proceed. They understood no guarantees could be given about the outcome of the procedure. DESCRIPTION OF PROCEDURE: After being identified in the preoperative holding area and having their operative site marked, the patient was brought back to the operating room where the  underwent anesthesia to good effect. They were transferred to the fracture table. Preoperative antibiotics were administered. The operative extremity was placed in a traction boot set-up. The uninjured lower extremity was placed in the well-leg chavez, being sure to pad all bony prominences, paying special attention to the area of the peroneal nerve. A preoperative reduction maneuver was performed under fluoroscopy. We then prepped and draped the operative extremity in the usual fashion using sterile technique. An appropriate time-out was performed.  We began by marking the lateral trajectory of the femur using fluoroscopy and a guidewire. We then returned to the A/P view. A stab incision was made proximal to the tip of the greater trochanter. The curved cannulated awl was inserted into the proximal femur. The guidewire was passed distally. We then used a 1-step reamer. The nail was inserted over the guidewire to the appropriate depth. The stacked trocars were used to place the guide pin for the lag screw in the center/center position. The indirect measuring guide was used to determine lag screw length. We drilled for this and placed the lag screw. We then placed the set screw. We then removed the guide pin and used the stacked trocars for the distal interlock, which was placed in a static fashion. Final hardware placement was confirmed on orthogonal views. The wounds were then closed in layers with Vicryl. Final skin closure was performed with skin staples. Sterile compressive dressings were applied. The patient was moved to the stretcher and taken to the recovery room in stable condition. At the conclusion of the procedure all counts were correct. There were no immediate complications. Additional procedure:    Procedure performed: Intraoperative fluoroscopy for Procedure(s):  RIGHT FEMUR INSERTION INTRA MEDULLARY NAIL    CPT code: 47369    Anesthesia: Other    Surgeon(s) and Role:     * Masha Tay MD - Primary    This note describes the use of intraoperative fluoroscopy. Fluoroscopic imaging was utilized in orthogonal planes as well as using live technique for all phases of the procedure, described separately in the operative report, including fracture reduction and hardware placement.     Masha Tay MD

## 2017-10-02 LAB
ANION GAP SERPL CALC-SCNC: 5 MMOL/L (ref 5–15)
BACTERIA SPEC CULT: NORMAL
BASOPHILS # BLD: 0 K/UL (ref 0–0.1)
BASOPHILS NFR BLD: 0 % (ref 0–1)
BUN SERPL-MCNC: 11 MG/DL (ref 6–20)
BUN/CREAT SERPL: 14 (ref 12–20)
CALCIUM SERPL-MCNC: 7.8 MG/DL (ref 8.5–10.1)
CC UR VC: NORMAL
CHLORIDE SERPL-SCNC: 106 MMOL/L (ref 97–108)
CO2 SERPL-SCNC: 26 MMOL/L (ref 21–32)
CREAT SERPL-MCNC: 0.79 MG/DL (ref 0.55–1.02)
EOSINOPHIL # BLD: 0 K/UL (ref 0–0.4)
EOSINOPHIL NFR BLD: 0 % (ref 0–7)
ERYTHROCYTE [DISTWIDTH] IN BLOOD BY AUTOMATED COUNT: 13.6 % (ref 11.5–14.5)
GLUCOSE SERPL-MCNC: 120 MG/DL (ref 65–100)
HCT VFR BLD AUTO: 24.1 % (ref 35–47)
HGB BLD-MCNC: 8 G/DL (ref 11.5–16)
LYMPHOCYTES # BLD: 1.3 K/UL (ref 0.8–3.5)
LYMPHOCYTES NFR BLD: 16 % (ref 12–49)
MCH RBC QN AUTO: 31.3 PG (ref 26–34)
MCHC RBC AUTO-ENTMCNC: 33.2 G/DL (ref 30–36.5)
MCV RBC AUTO: 94.1 FL (ref 80–99)
MONOCYTES # BLD: 1.4 K/UL (ref 0–1)
MONOCYTES NFR BLD: 17 % (ref 5–13)
NEUTS SEG # BLD: 5.6 K/UL (ref 1.8–8)
NEUTS SEG NFR BLD: 67 % (ref 32–75)
PLATELET # BLD AUTO: 107 K/UL (ref 150–400)
POTASSIUM SERPL-SCNC: 4.5 MMOL/L (ref 3.5–5.1)
RBC # BLD AUTO: 2.56 M/UL (ref 3.8–5.2)
SERVICE CMNT-IMP: NORMAL
SODIUM SERPL-SCNC: 137 MMOL/L (ref 136–145)
WBC # BLD AUTO: 8.2 K/UL (ref 3.6–11)

## 2017-10-02 PROCEDURE — 85025 COMPLETE CBC W/AUTO DIFF WBC: CPT | Performed by: INTERNAL MEDICINE

## 2017-10-02 PROCEDURE — 97165 OT EVAL LOW COMPLEX 30 MIN: CPT

## 2017-10-02 PROCEDURE — 36415 COLL VENOUS BLD VENIPUNCTURE: CPT | Performed by: INTERNAL MEDICINE

## 2017-10-02 PROCEDURE — 74011250637 HC RX REV CODE- 250/637: Performed by: INTERNAL MEDICINE

## 2017-10-02 PROCEDURE — 65660000000 HC RM CCU STEPDOWN

## 2017-10-02 PROCEDURE — G8978 MOBILITY CURRENT STATUS: HCPCS | Performed by: PHYSICAL THERAPIST

## 2017-10-02 PROCEDURE — 80048 BASIC METABOLIC PNL TOTAL CA: CPT | Performed by: ORTHOPAEDIC SURGERY

## 2017-10-02 PROCEDURE — 74011250637 HC RX REV CODE- 250/637: Performed by: NURSE PRACTITIONER

## 2017-10-02 PROCEDURE — 74011250636 HC RX REV CODE- 250/636: Performed by: EMERGENCY MEDICINE

## 2017-10-02 PROCEDURE — G8979 MOBILITY GOAL STATUS: HCPCS | Performed by: PHYSICAL THERAPIST

## 2017-10-02 PROCEDURE — 97116 GAIT TRAINING THERAPY: CPT | Performed by: PHYSICAL THERAPIST

## 2017-10-02 PROCEDURE — 74011250637 HC RX REV CODE- 250/637: Performed by: ORTHOPAEDIC SURGERY

## 2017-10-02 PROCEDURE — 97161 PT EVAL LOW COMPLEX 20 MIN: CPT | Performed by: PHYSICAL THERAPIST

## 2017-10-02 RX ORDER — FAMOTIDINE 20 MG/1
20 TABLET, FILM COATED ORAL 2 TIMES DAILY
Status: DISCONTINUED | OUTPATIENT
Start: 2017-10-02 | End: 2017-10-03 | Stop reason: HOSPADM

## 2017-10-02 RX ORDER — GUAIFENESIN 100 MG/5ML
81 LIQUID (ML) ORAL 2 TIMES DAILY
Status: DISCONTINUED | OUTPATIENT
Start: 2017-10-02 | End: 2017-10-03 | Stop reason: HOSPADM

## 2017-10-02 RX ORDER — OXYCODONE HYDROCHLORIDE 5 MG/1
5-10 TABLET ORAL
Status: DISCONTINUED | OUTPATIENT
Start: 2017-10-02 | End: 2017-10-03 | Stop reason: HOSPADM

## 2017-10-02 RX ADMIN — ASPIRIN 81 MG 81 MG: 81 TABLET ORAL at 18:13

## 2017-10-02 RX ADMIN — ACETAMINOPHEN 650 MG: 325 TABLET ORAL at 14:30

## 2017-10-02 RX ADMIN — SODIUM CHLORIDE 500 ML: 900 INJECTION, SOLUTION INTRAVENOUS at 00:00

## 2017-10-02 RX ADMIN — OXYCODONE HYDROCHLORIDE 10 MG: 5 TABLET ORAL at 14:32

## 2017-10-02 RX ADMIN — OXYCODONE HYDROCHLORIDE 2.5 MG: 5 TABLET ORAL at 07:04

## 2017-10-02 RX ADMIN — POLYETHYLENE GLYCOL 3350 17 G: 17 POWDER, FOR SOLUTION ORAL at 08:55

## 2017-10-02 RX ADMIN — OXYCODONE HYDROCHLORIDE 2.5 MG: 5 TABLET ORAL at 10:52

## 2017-10-02 RX ADMIN — FAMOTIDINE 20 MG: 20 TABLET ORAL at 10:49

## 2017-10-02 RX ADMIN — Medication 10 ML: at 19:07

## 2017-10-02 RX ADMIN — OXYCODONE HYDROCHLORIDE 5 MG: 5 TABLET ORAL at 18:12

## 2017-10-02 RX ADMIN — ACETAMINOPHEN 650 MG: 325 TABLET ORAL at 08:54

## 2017-10-02 RX ADMIN — ACETAMINOPHEN 650 MG: 325 TABLET ORAL at 21:23

## 2017-10-02 RX ADMIN — CYCLOBENZAPRINE HYDROCHLORIDE 10 MG: 10 TABLET, FILM COATED ORAL at 03:09

## 2017-10-02 RX ADMIN — FAMOTIDINE 20 MG: 20 TABLET ORAL at 18:12

## 2017-10-02 RX ADMIN — ASPIRIN 81 MG 81 MG: 81 TABLET ORAL at 10:49

## 2017-10-02 RX ADMIN — Medication 10 ML: at 21:23

## 2017-10-02 NOTE — PROGRESS NOTES
Consult acknowledged    As per PT recommendation IP rehab referral send to [facility] through [facility]. FOC given. CM will followup with [facility].     [place]  Ext [phone]

## 2017-10-02 NOTE — INTERDISCIPLINARY ROUNDS
Bedside interdisciplinary rounds were held today to discuss patient plan of care and outcomes. The following members were present: Physician, Nurse, Nurse Practitioner, Clinical Care Leader, Pharmacy, Physical Therapy, and Case Management. Plan:  TTWB. ALISTAIR Ba[place] and monitor for void. Await therapy recommendations - goal for home.

## 2017-10-02 NOTE — PROGRESS NOTES
Problem: Self Care Deficits Care Plan (Adult)  Goal: *Acute Goals and Plan of Care (Insert Text)  Occupational Therapy Goals  Initiated 10/2/2017  1. Patient will perform LB dressing using AE PRN with supervision/set-up within 7 day(s). 2. Patient will perform functional mobility while maintaining TTWB with independence within 7 day(s). 3. Patient will perform seated bathing task with supervision/set-up within 7 day(s). 4. Patient will perform toilet transfers in bathroom with RW and supervision/setup within 7 day(s). 5. Patient will perform all aspects of toileting with supervision/set-up within 7 day(s). OCCUPATIONAL THERAPY EVALUATION  Patient: Lázaro Ahmadi (45 y.o. female)  Date: 10/2/2017  Primary Diagnosis: right hip fracture  Closed right hip fracture (HCC)  Procedure(s) (LRB):  RIGHT FEMUR INSERTION INTRA MEDULLARY NAIL (Right) 1 Day Post-Op   Precautions:   TTWB      ASSESSMENT :  Based on the objective data described below, the patient presents with limited mobility due to WB restrictions, decreased endurance, pain with upright activity, risk for falls and decline in functional independence s/p R femur IM nailing pod 1. Pt received EOB with PT, required min A x 2 for sit to stand, verbal cues for sequencing on RW and advancement of LEs. Pt initially required A to advance R LE but with increased time pt able to advance R LE while adhering to TTWB status. She is needing up to mod A for LB ADLs and would benefit from hip kit items to assist with LB dressing. Pt is very motivated and would be an appropriate IP rehab as she lives alone and was completely independent prior. Patient will benefit from skilled intervention to address the above impairments.   Patients rehabilitation potential is considered to be Excellent  Factors which may influence rehabilitation potential include:   [X]             None noted  [ ]             Mental ability/status  [ ]             Medical condition  [ ] Home/family situation and support systems  [ ]             Safety awareness  [ ]             Pain tolerance/management  [ ]             Other:        PLAN :  Recommendations and Planned Interventions:  [X]               Self Care Training                  [X]        Therapeutic Activities  [X]               Functional Mobility Training    [ ]        Cognitive Retraining  [X]               Therapeutic Exercises           [X]        Endurance Activities  [X]               Balance Training                   [ ]        Neuromuscular Re-Education  [ ]               Visual/Perceptual Training     [X]   Home Safety Training  [X]               Patient Education                 [X]        Family Training/Education  [ ]               Other (comment):     Frequency/Duration: Patient will be followed by occupational therapy 5 times a week to address goals. Discharge Recommendations: Inpatient Rehab  Further Equipment Recommendations for Discharge: TBD       SUBJECTIVE:   Patient stated It hurts.       OBJECTIVE DATA SUMMARY:   HISTORY:   History reviewed. No pertinent past medical history.   Past Surgical History:   Procedure Laterality Date    HX GYN          hysterctomy    HX ORTHOPAEDIC         foot surgery        Prior Level of Function/Home Situation: lives alone, independent, active, driving  Expanded or extensive additional review of patient history:      Home Situation  Home Environment: Private residence  # Steps to Enter: 4  Rails to Enter: Yes  Hand Rails : Right  One/Two Story Residence: Two story  Interior Rails: Right  Living Alone: Yes  Support Systems: Friends \ neighbors, Spouse/Significant Other/Partner  Patient Expects to be Discharged to[de-identified] Private residence  Current DME Used/Available at Home: None (built-in shower chair)  Tub or Shower Type: Shower  [ ]  Right hand dominant   [ ]  Left hand dominant     EXAMINATION OF PERFORMANCE DEFICITS:  Cognitive/Behavioral Status:  Neurologic State: Alert; Appropriate for age  Orientation Level: Oriented X4  Cognition: Appropriate decision making; Appropriate for age attention/concentration; Appropriate safety awareness              Skin: intact     Edema: R hip, ice applied end of session     Hearing: Auditory  Auditory Impairment: None     Vision/Perceptual:                 intact                     Range of Motion:     AROM: Within functional limits (except LLE limited 2/2 pain)                          Strength:     Strength: Within functional limits                 Coordination:  Coordination: Within functional limits  Fine Motor Skills-Upper: Left Intact; Right Intact    Gross Motor Skills-Upper: Left Intact; Right Intact     Tone & Sensation:     Tone: Normal  Sensation: Intact                       Balance:  Sitting: Intact  Standing: Impaired  Standing - Static: Fair;Constant support  Standing - Dynamic : Fair (using RW for support)     Functional Mobility and Transfers for ADLs:  Bed Mobility:  Supine to Sit: Moderate assistance;Minimum assistance;Assist x2; Additional time  Scooting: Minimum assistance; Moderate assistance;Assist x2; Additional time     Transfers:  Sit to Stand: Minimum assistance;Assist x2  Stand to Sit: Minimum assistance;Assist x2  Bed to Chair: Minimum assistance;Assist x2  Toilet Transfer : Minimum assistance;Assist x2 (inferred by transfer to chair)     ADL Assessment:  Feeding: Independent     Oral Facial Hygiene/Grooming: Modified Independent (after setup)     Bathing: Moderate assistance     Upper Body Dressing: Supervision     Lower Body Dressing: Moderate assistance (decreased functional reach to B ankles)     Toileting: Moderate assistance                 ADL Intervention and task modifications:    Pt educated on role of OT and required verbal cues for safety and proper hand placement during ADLs/functional transfers.                               Functional Measure:  Barthel Index:      Bathin  Bladder: 10  Bowels: 10  Groomin  Dressin  Feeding: 10  Mobility: 0  Stairs: 0  Toilet Use: 5  Transfer (Bed to Chair and Back): 5  Total: 50         Barthel and G-code impairment scale:  Percentage of impairment CH  0% CI  1-19% CJ  20-39% CK  40-59% CL  60-79% CM  80-99% CN  100%   Barthel Score 0-100 100 99-80 79-60 59-40 20-39 1-19    0   Barthel Score 0-20 20 17-19 13-16 9-12 5-8 1-4 0      The Barthel ADL Index: Guidelines  1. The index should be used as a record of what a patient does, not as a record of what a patient could do. 2. The main aim is to establish degree of independence from any help, physical or verbal, however minor and for whatever reason. 3. The need for supervision renders the patient not independent. 4. A patient's performance should be established using the best available evidence. Asking the patient, friends/relatives and nurses are the usual sources, but direct observation and common sense are also important. However direct testing is not needed. 5. Usually the patient's performance over the preceding 24-48 hours is important, but occasionally longer periods will be relevant. 6. Middle categories imply that the patient supplies over 50 per cent of the effort. 7. Use of aids to be independent is allowed. Bernarda Metcalf., Barthel, D.W. (7852). Functional evaluation: the Barthel Index. [facility] (14)2. [facility] TREV Tavares, Barbie Trujillo., Sanya Gasca., [place], [address] [place] [address] (). Measuring the change indisability after inpatient rehabilitation; comparison of the responsiveness of the Barthel Index and Functional [place] Measure. Journal of Neurology, Neurosurgery, and Psychiatry, 66(4), 955-683. Aarti Serrato, N.J.A, JAN WuJ.M, & Jose Alberto Olguin, M.A. (2004.) Assessment of post-stroke quality of life in cost-effectiveness studies: The usefulness of the Barthel Index and the EuroQoL-5D. Quality of Life Research, 13, 640-87            G codes:   In compliance with CMSs Claims Based Outcome Reporting, the following G-code set was chosen for this patient based on their primary functional limitation being treated: The outcome measure chosen to determine the severity of the functional limitation was the barthel index with a score of 50/100 which was correlated with the impairment scale. · Self Care:               - CURRENT STATUS:    CK - 40%-59% impaired, limited or restricted               - GOAL STATUS:           CJ - 20%-39% impaired, limited or restricted               - D/C STATUS:                       ---------------To be determined---------------      Occupational Therapy Evaluation Charge Determination   History Examination Decision-Making   LOW Complexity : Brief history review  LOW Complexity : 1-3 performance deficits relating to physical, cognitive , or psychosocial skils that result in activity limitations and / or participation restrictions  LOW Complexity : No comorbidities that affect functional and no verbal or physical assistance needed to complete eval tasks       Based on the above components, the patient evaluation is determined to be of the following complexity level: LOW   Pain:  Pain Scale 1: Numeric (0 - 10)  Pain Intensity 1: 5  Pain Location 1: Hip  Pain Orientation 1: Right  Pain Description 1: Aching  Pain Intervention(s) 1: Medication (see MAR)  Activity Tolerance:   VSS  Please refer to the flowsheet for vital signs taken during this treatment. After treatment:   [X] Patient left in no apparent distress sitting up in chair  [ ] Patient left in no apparent distress in bed  [X] Call bell left within reach  [X] Nursing notified  [ ] Caregiver present  [ ] Bed alarm activated      COMMUNICATION/EDUCATION:   The patients plan of care was discussed with: Physical Therapist, Registered Nurse and Rehabilitation Attendant. [ ] Home safety education was provided and the patient/caregiver indicated understanding.   [X] Patient/family have participated as able in goal setting and plan of care. [X] Patient/family agree to work toward stated goals and plan of care. [ ] Patient understands intent and goals of therapy, but is neutral about his/her participation. [ ] Patient is unable to participate in goal setting and plan of care. This patients plan of care is appropriate for delegation to [facility].      Thank you for this referral.  Yolanda Mireles OT  Time Calculation: 15 mins

## 2017-10-02 NOTE — PROGRESS NOTES
Asked patient to turn to perform skin assessment. Patient refused to turn. Patient education taught, still refused to turn after education.

## 2017-10-02 NOTE — PROGRESS NOTES
Bedside shift change report given to TAQUERIA Caban (oncoming nurse) by Sherry Scott RN and Ronaldo Holguin RN (offgoing nurse). Report included the following information SBAR, Procedure Summary, Intake/Output, MAR and Recent Results.

## 2017-10-02 NOTE — PROGRESS NOTES
Occupational Therapy  Chart reviewed, referral received for OT. Pt currently without formal WB status orders in chart. Discussed with ortho NP who clarify with ortho MD. Will hold until then. Thank you.  Zach Raphael MS, OTR/L

## 2017-10-02 NOTE — PROGRESS NOTES
Problem: Mobility Impaired (Adult and Pediatric)  Goal: *Acute Goals and Plan of Care (Insert Text)  Physical Therapy Goals  Initiated 10/2/2017  1. Patient will move from supine to sit and sit to supine , scoot up and down and roll side to side in bed with independence within 7 day(s). 2. Patient will transfer from bed to chair and chair to bed with modified independence using the least restrictive device within 7 day(s). 3. Patient will perform sit to stand with modified independence within 7 day(s). 4. Patient will ambulate with modified independence for 50 feet with the least restrictive device within 7 day(s). 5. Patient will ascend/descend 12 stairs with 1 handrail(s) with supervision/set-up within 7 day(s). PHYSICAL THERAPY EVALUATION  Patient: Manfred Fierro (47 y.o. female)  Date: 10/2/2017  Primary Diagnosis: right hip fracture  Closed right hip fracture (HCC)  Procedure(s) (LRB):  RIGHT FEMUR INSERTION INTRA MEDULLARY NAIL (Right) 1 Day Post-Op   Precautions: TDWB RLE         ASSESSMENT :  Based on the objective data described below, the patient presents with impaired functional mobility, balance and endurance following s/p fall with subsequent hip fx requiring IM nailing. Weight bearing status on R is TDWB and patient demonstrates good ability to maintain TDWB. Patient requiring min/mod A of 2 for bed mobility and noted to report increased pain with all movement. Patient requires min A of 2 for transfers and ambulation. Gait is slow and unsteady demonstrating short shuffled steps and poor ability to clear L foot from floor. Patient only able to ambulate 7 feet this am.  Prior to fall, patient reports complete independence with all mobility and was very active at baseline. Recommend inpatient rehab following discharge to regain functional independence prior to returning to home. Patient will benefit from skilled intervention to address the above impairments.   Patients rehabilitation potential is considered to be Good  Factors which may influence rehabilitation potential include:   [X]         None noted  [ ]         Mental ability/status  [ ]         Medical condition  [ ]         Home/family situation and support systems  [ ]         Safety awareness  [ ]         Pain tolerance/management  [ ]         Other:        PLAN :  Recommendations and Planned Interventions:  [X]           Bed Mobility Training             [ ]    Neuromuscular Re-Education  [X]           Transfer Training                   [ ]    Orthotic/Prosthetic Training  [X]           Gait Training                         [ ]    Modalities  [X]           Therapeutic Exercises           [ ]    Edema Management/Control  [ ]           Therapeutic Activities            [X]    Patient and Family Training/Education  [X]           Other (comment): stairs      Frequency/Duration: Patient will be followed by physical therapy  twice daily to address goals. Discharge Recommendations: Inpatient Rehab  Further Equipment Recommendations for Discharge: TBD by rehab       SUBJECTIVE:   Patient stated the pressure on my hip when I sit really hurts.       OBJECTIVE DATA SUMMARY:   HISTORY:    History reviewed. No pertinent past medical history.   Past Surgical History:   Procedure Laterality Date    HX GYN          hysterctomy    HX ORTHOPAEDIC         foot surgery     Prior Level of Function/Home Situation: patient reports complete independence with all mobility and ADLs; very active at baseline        [address]: Private residence  # Steps to Enter: 4  Rails to Enter: Yes  Hand Rails : Right  One/Two Story Residence: Two story  Interior Rails: Right  Living Alone: Yes  Support Systems: Friends \ neighbors, Spouse/Significant Other/Partner  Patient Expects to be Discharged to[de-identified] Private residence  Current DME Used/Available at Home: None (built-in shower chair)  Tub or Shower Type: Shower EXAMINATION/PRESENTATION/DECISION MAKING:   Critical Behavior:  Neurologic State: Alert, Appropriate for age  Orientation Level: Oriented X4  Cognition: Appropriate decision making, Appropriate for age attention/concentration, Appropriate safety awareness     Hearing: Auditory  Auditory Impairment: None     Range Of Motion:  AROM: Within functional limits (except LLE limited 2/2 pain)                       Strength:    Strength: Within functional limits                    Tone & Sensation:   Tone: Normal              Sensation: Intact               Coordination:  Coordination: Within functional limits  Vision:      Functional Mobility:  Bed Mobility:     Supine to Sit: Moderate assistance;Minimum assistance;Assist x2; Additional time     Scooting: Minimum assistance; Moderate assistance;Assist x2; Additional time  Transfers:  Sit to Stand: Minimum assistance;Assist x2  Stand to Sit: Minimum assistance;Assist x2        Bed to Chair: Minimum assistance;Assist x2              Balance:   Sitting: Intact  Standing: Impaired  Standing - Static: Fair;Constant support  Standing - Dynamic : Fair (using RW for support)  Ambulation/Gait Training:  Distance (ft): 7 Feet (ft)  Assistive Device: Walker, rolling  Ambulation - Level of Assistance: Minimal assistance        Gait Abnormalities: Decreased step clearance  Right Side Weight Bearing: Toe touch     Base of Support: Shift to left     Speed/Jenna: Pace decreased (<100 feet/min); Shuffled; Slow  Step Length: Left shortened;Right shortened      Gait is slow and unsteady demonstrating poor ability to advance LLE; good ability to maintain TDWB on R              Functional Measure:     Elder Mobility Scale      2/20            EMS and G-code impairment scale:  Percentage of impairment CH  0% CI  1-19% CJ  20-39% CK  40-59% CL  60-79% CM  80-99% CN  100%   EMS Score 0-20 20 17-19 13-16 9-12 5-8 1-4 0      Scores under 10  generally these patients are dependent in mobility maneuvers; require help with  basic ADL, such as transfers, toileting and dressing. Scores between 10  13  generally these patients are borderline in terms of safe mobility and  independence in ADL i.e. they require some help with some mobility maneuvers. Scores over 14  Generally these patients are able to perform mobility maneuvers alone and safely  and are independent in basic ADL. G codes: In compliance with CMSs Claims Based Outcome Reporting, the following G-code set was chosen for this patient based on their primary functional limitation being treated: The outcome measure chosen to determine the severity of the functional limitation was the Elderly mobility scale with a score of 2/20 which was correlated with the impairment scale. · Mobility - Walking and Moving Around:               - CURRENT STATUS:    CM - 80%-99% impaired, limited or restricted               - GOAL STATUS:           CI - 1%-19% impaired, limited or restricted               - D/C STATUS:                       ---------------To be determined---------------            Pain:  Pain Scale 1: Numeric (0 - 10)  Pain Intensity 1: 5  Pain Location 1: Hip  Pain Orientation 1: Right  Pain Description 1: Aching  Pain Intervention(s) 1: Medication (see MAR)     After treatment:   [X]         Patient left in no apparent distress sitting up in chair  [ ]         Patient left in no apparent distress in bed  [X]         Call bell left within reach  [X]         Nursing notified  [X]         Caregiver present  [ ]         Bed alarm activated      COMMUNICATION/EDUCATION:   The patients plan of care was discussed with: Occupational Therapist, Registered Nurse and Rehabilitation Attendant. [X]         Fall prevention education was provided and the patient/caregiver indicated understanding. [X]         Patient/family have participated as able in goal setting and plan of care.   [X]         Patient/family agree to work toward stated goals and plan of care. [ ]         Patient understands intent and goals of therapy, but is neutral about his/her participation. [ ]         Patient is unable to participate in goal setting and plan of care.      Thank you for this referral.  Cesario Leung, PT   Time Calculation: 29 mins

## 2017-10-02 NOTE — PROGRESS NOTES
Problem: Mobility Impaired (Adult and Pediatric)  Goal: *Acute Goals and Plan of Care (Insert Text)  Physical Therapy Goals  Initiated 10/2/2017  1. Patient will move from supine to sit and sit to supine , scoot up and down and roll side to side in bed with independence within 7 day(s). 2. Patient will transfer from bed to chair and chair to bed with modified independence using the least restrictive device within 7 day(s). 3. Patient will perform sit to stand with modified independence within 7 day(s). 4. Patient will ambulate with modified independence for 50 feet with the least restrictive device within 7 day(s). 5. Patient will ascend/descend 12 stairs with 1 handrail(s) with supervision/set-up within 7 day(s). PHYSICAL THERAPY TREATMENT  Patient: Eleazar Carrera (89 y.o. female)  Date: 10/2/2017  Diagnosis: right hip fracture  Closed right hip fracture (HCC) <principal problem not specified>  Procedure(s) (LRB):  RIGHT FEMUR INSERTION INTRA MEDULLARY NAIL (Right) 1 Day Post-Op  Precautions: TTWB      ASSESSMENT: Patient demonstrating steady progress in therapy. Patient requiring min/mod A of2 for bed mobility but able to perform transfers with CGA. Patient continues to demonstrate difficulty advancing RLE requiring min A to advance, however balance has improved and patient only requires CGA for stability. Patient continues to demonstrate good compliance of weight bearing status on RLE. Patient is an excellent inpatient rehab candidate to regain functional independence prior to returning to home     Progression toward goals:  [ ]    Improving appropriately and progressing toward goals  [X]    Improving slowly and progressing toward goals  [ ]    Not making progress toward goals and plan of care will be adjusted       PLAN:  Patient continues to benefit from skilled intervention to address the above impairments. Continue treatment per established plan of care.   Discharge Recommendations:  Inpatient Rehab  Further Equipment Recommendations for Discharge:  TBD by rehab       SUBJECTIVE:   Patient stated I'm so tired.       OBJECTIVE DATA SUMMARY:   Critical Behavior:  Neurologic State: Alert, Appropriate for age  Orientation Level: Oriented X4  Cognition: Appropriate decision making, Appropriate for age attention/concentration, Appropriate safety awareness     Functional Mobility Training:  Bed Mobility:        Sit to Supine: Minimum assistance; Moderate assistance;Assist x2  Scooting: Contact guard assistance        Transfers:  Sit to Stand: Contact guard assistance  Stand to Sit: Contact guard assistance                             Balance:  Sitting: Intact  Standing: Impaired  Standing - Static: Good;Constant support  Standing - Dynamic : Fair (using RW for support)  Ambulation/Gait Training:  Distance (ft): 15 Feet (ft)  Assistive Device: Walker, rolling  Ambulation - Level of Assistance:  (for balance and min A to advance RLE)        Gait Abnormalities: Decreased step clearance  Right Side Weight Bearing: Toe touch     Base of Support: Shift to left     Speed/Jenna: Pace decreased (<100 feet/min); Shuffled; Slow  Step Length: Left shortened;Right shortened      Gait is slow and mildly unsteady demonstrating poor ability to advance RLE.  Min A required to advance RLE and to advance RW;     Pain:  Pain Scale 1: Numeric (0 - 10)  Pain Intensity 1: 3  Pain Location 1: Hip  Pain Orientation 1: Right  Pain Description 1: Aching  Pain Intervention(s) 1: Medication (see MAR)     After treatment:   [ ]    Patient left in no apparent distress sitting up in chair  [X]    Patient left in no apparent distress in bed  [X]    Call bell left within reach  [X]    Nursing notified  [ ]    Caregiver present  [ ]    Bed alarm activated      COMMUNICATION/COLLABORATION:   The patients plan of care was discussed with: Registered Nurse and Rehabilitation Attendant     Winston Chisholm PT   Time Calculation: 14 mins

## 2017-10-02 NOTE — PROGRESS NOTES
Ortho/ NeuroSurgery NP Note    POD# 1  s/p RIGHT FEMUR INSERTION INTRA MEDULLARY NAIL   Pt discussed with Dr. Candice King. Pt resting in bed. No complaints. Expected post-op pain. Patient using smallest dose of Oxycodone possible at this time. States she feels fine at rest but is apprehensive about mobilizing. Discussed pain meds and use and advised patient to ask for more if needed. Also, c/o heavy feeling in the operative leg. VSS Afebrile. Bahena in place. Nursing at bedside to remove at this time. Labs  Lab Results   Component Value Date/Time    HGB 8.0 10/02/2017 05:49 AM        Body mass index is 24.96 kg/(m^2). BMI greater than 30 is classified as obesity and greater than 40 is classified as morbid obesity. Dressing c.d.i, cryotherapy   Calves soft and supple; No pain with passive stretch  Sensation and motor intact  SCDs for mechanical DVT proph while in bed     PLAN:  1) PT BID TTWB  2) Aspirin 81 mg PO BID for DVT Prophylaxis  3) Pepcid for GI Prophylaxis. 4) Plan d/c to home vs rehab depending on therapy progress.      Jose Delgado, DENISE

## 2017-10-02 NOTE — PROGRESS NOTES
Physical Therapy  Consult received and chart reviewed. Awaiting weight bearing status clarification. NP aware. Will defer therapy at this time and follow back later.   Annabelle Mir, PT

## 2017-10-03 ENCOUNTER — HOSPITAL ENCOUNTER (OUTPATIENT)
Age: 72
Discharge: HOME | End: 2017-10-14
Attending: PHYSICAL MEDICINE & REHABILITATION | Admitting: PHYSICAL MEDICINE & REHABILITATION

## 2017-10-03 VITALS
SYSTOLIC BLOOD PRESSURE: 124 MMHG | BODY MASS INDEX: 25.02 KG/M2 | WEIGHT: 159.39 LBS | HEART RATE: 91 BPM | OXYGEN SATURATION: 97 % | RESPIRATION RATE: 18 BRPM | HEIGHT: 67 IN | DIASTOLIC BLOOD PRESSURE: 52 MMHG | TEMPERATURE: 98.7 F

## 2017-10-03 VITALS — HEIGHT: 67 IN | WEIGHT: 146 LBS | BODY MASS INDEX: 22.91 KG/M2

## 2017-10-03 PROCEDURE — 74011250637 HC RX REV CODE- 250/637: Performed by: INTERNAL MEDICINE

## 2017-10-03 PROCEDURE — 74011250637 HC RX REV CODE- 250/637: Performed by: NURSE PRACTITIONER

## 2017-10-03 PROCEDURE — 97116 GAIT TRAINING THERAPY: CPT

## 2017-10-03 PROCEDURE — 97530 THERAPEUTIC ACTIVITIES: CPT

## 2017-10-03 PROCEDURE — 97110 THERAPEUTIC EXERCISES: CPT

## 2017-10-03 RX ORDER — FACIAL-BODY WIPES
10 EACH TOPICAL DAILY PRN
Status: DISCONTINUED | OUTPATIENT
Start: 2017-10-03 | End: 2017-10-14 | Stop reason: HOSPADM

## 2017-10-03 RX ORDER — AMOXICILLIN 250 MG
1 CAPSULE ORAL 2 TIMES DAILY
Status: DISCONTINUED | OUTPATIENT
Start: 2017-10-03 | End: 2017-10-12

## 2017-10-03 RX ORDER — LANOLIN ALCOHOL/MO/W.PET/CERES
3 CREAM (GRAM) TOPICAL
Status: DISCONTINUED | OUTPATIENT
Start: 2017-10-03 | End: 2017-10-14 | Stop reason: HOSPADM

## 2017-10-03 RX ORDER — GUAIFENESIN 100 MG/5ML
81 LIQUID (ML) ORAL 2 TIMES DAILY
Status: DISCONTINUED | OUTPATIENT
Start: 2017-10-03 | End: 2017-10-12

## 2017-10-03 RX ORDER — OXYCODONE HYDROCHLORIDE 5 MG/1
5 TABLET ORAL
Status: DISCONTINUED | OUTPATIENT
Start: 2017-10-03 | End: 2017-10-04

## 2017-10-03 RX ORDER — AMOXICILLIN 250 MG
1 CAPSULE ORAL 2 TIMES DAILY
Qty: 60 TAB | Refills: 0 | Status: SHIPPED
Start: 2017-10-03 | End: 2020-03-12 | Stop reason: ALTCHOICE

## 2017-10-03 RX ORDER — ADHESIVE BANDAGE
30 BANDAGE TOPICAL DAILY PRN
Status: DISCONTINUED | OUTPATIENT
Start: 2017-10-03 | End: 2017-10-14 | Stop reason: HOSPADM

## 2017-10-03 RX ORDER — POLYETHYLENE GLYCOL 3350 17 G/17G
17 POWDER, FOR SOLUTION ORAL DAILY
Status: DISCONTINUED | OUTPATIENT
Start: 2017-10-04 | End: 2017-10-12

## 2017-10-03 RX ORDER — ONDANSETRON 4 MG/1
4 TABLET, ORALLY DISINTEGRATING ORAL
Status: DISCONTINUED | OUTPATIENT
Start: 2017-10-03 | End: 2017-10-14 | Stop reason: HOSPADM

## 2017-10-03 RX ORDER — OXYCODONE HYDROCHLORIDE 5 MG/1
10 TABLET ORAL
Status: DISCONTINUED | OUTPATIENT
Start: 2017-10-03 | End: 2017-10-04

## 2017-10-03 RX ORDER — FAMOTIDINE 20 MG/1
20 TABLET, FILM COATED ORAL 2 TIMES DAILY
Status: DISCONTINUED | OUTPATIENT
Start: 2017-10-03 | End: 2017-10-05

## 2017-10-03 RX ORDER — OXYCODONE HYDROCHLORIDE 5 MG/1
5 TABLET ORAL
Qty: 20 TAB | Refills: 0 | Status: SHIPPED | OUTPATIENT
Start: 2017-10-03 | End: 2017-10-10

## 2017-10-03 RX ORDER — POLYETHYLENE GLYCOL 3350 17 G/17G
17 POWDER, FOR SOLUTION ORAL
Qty: 15 PACKET | Refills: 0 | Status: SHIPPED | OUTPATIENT
Start: 2017-10-03 | End: 2020-03-12 | Stop reason: ALTCHOICE

## 2017-10-03 RX ORDER — FAMOTIDINE 20 MG/1
20 TABLET, FILM COATED ORAL 2 TIMES DAILY
Qty: 60 TAB | Refills: 0 | Status: SHIPPED
Start: 2017-10-03 | End: 2017-11-02

## 2017-10-03 RX ORDER — ACETAMINOPHEN 325 MG/1
650 TABLET ORAL EVERY 6 HOURS
Qty: 112 TAB | Refills: 0 | Status: SHIPPED
Start: 2017-10-03 | End: 2017-10-17

## 2017-10-03 RX ORDER — GUAIFENESIN 100 MG/5ML
81 LIQUID (ML) ORAL 2 TIMES DAILY
Qty: 60 TAB | Refills: 0 | Status: SHIPPED | OUTPATIENT
Start: 2017-10-03 | End: 2017-11-02

## 2017-10-03 RX ORDER — ACETAMINOPHEN 325 MG/1
650 TABLET ORAL 4 TIMES DAILY
Status: DISCONTINUED | OUTPATIENT
Start: 2017-10-03 | End: 2017-10-05

## 2017-10-03 RX ADMIN — ACETAMINOPHEN 650 MG: 325 TABLET ORAL at 02:42

## 2017-10-03 RX ADMIN — ACETAMINOPHEN 650 MG: 325 TABLET ORAL at 14:20

## 2017-10-03 RX ADMIN — OXYCODONE HYDROCHLORIDE 5 MG: 5 TABLET ORAL at 06:54

## 2017-10-03 RX ADMIN — OXYCODONE HYDROCHLORIDE 5 MG: 5 TABLET ORAL at 15:03

## 2017-10-03 RX ADMIN — FAMOTIDINE 20 MG: 20 TABLET ORAL at 08:40

## 2017-10-03 RX ADMIN — ASPIRIN 81 MG 81 MG: 81 TABLET ORAL at 08:40

## 2017-10-03 RX ADMIN — Medication 10 ML: at 06:35

## 2017-10-03 RX ADMIN — OXYCODONE HYDROCHLORIDE 10 MG: 5 TABLET ORAL at 10:32

## 2017-10-03 RX ADMIN — Medication 10 ML: at 13:39

## 2017-10-03 RX ADMIN — ACETAMINOPHEN 650 MG: 325 TABLET ORAL at 08:40

## 2017-10-03 RX ADMIN — POLYETHYLENE GLYCOL 3350 17 G: 17 POWDER, FOR SOLUTION ORAL at 08:40

## 2017-10-03 RX ADMIN — OXYCODONE HYDROCHLORIDE 5 MG: 5 TABLET ORAL at 02:42

## 2017-10-03 NOTE — PROGRESS NOTES
Visited by Bharat Padilla Partner 10/3/17.     SABINA Antonio, [facility], [address]    [address] Paging Service  [phone]

## 2017-10-03 NOTE — PROGRESS NOTES
Problem: Self Care Deficits Care Plan (Adult)  Goal: *Acute Goals and Plan of Care (Insert Text)  Occupational Therapy Goals  Initiated 10/2/2017  1. Patient will perform LB dressing using AE PRN with supervision/set-up within 7 day(s). 2. Patient will perform functional mobility while maintaining TTWB with independence within 7 day(s). 3. Patient will perform seated bathing task with supervision/set-up within 7 day(s). 4. Patient will perform toilet transfers in bathroom with RW and supervision/setup within 7 day(s). 5. Patient will perform all aspects of toileting with supervision/set-up within 7 day(s). OCCUPATIONAL THERAPY TREATMENT  Patient: Toro Varma (7 y.o. female)  Date: 10/3/2017  Diagnosis: right hip fracture  Closed right hip fracture (HCC) <principal problem not specified>  Procedure(s) (LRB):  RIGHT FEMUR INSERTION INTRA MEDULLARY NAIL (Right) 2 Days Post-Op  Precautions: TTWB      ASSESSMENT:  Pt is progressing but remains limited in all aspects of functional mobility and ADLs by WB limitations, impaired balance and risk for falls. Pt required min A for management of R LE during supine to sit, intact sitting balance. Pt completed sit to stand with CGA and verbal cues for hand placement. Pt completed functional mobility to bathroom  with CGA to min A for RW management with cues for sequencing. Pt with excellent adherence to TTWB on R. Pt completed toilet transfer with BSC setup over toilet with CGA and verbal cues. Pt demonstrated good activity tolerance post toileting to transfer back to chair without rest break. She remains extremely motivated and would be appropriate for IP rehab upon discharge.     Progression toward goals:  [ ]       Improving appropriately and progressing toward goals  [X]       Improving slowly and progressing toward goals  [ ]       Not making progress toward goals and plan of care will be adjusted       PLAN:  Patient continues to benefit from skilled intervention to address the above impairments. Continue treatment per established plan of care. Discharge Recommendations:  Inpatient Rehab  Further Equipment Recommendations for Discharge:  TBD       SUBJECTIVE:   Patient stated my pain is about a 5 or 6. (after working with OT)      OBJECTIVE DATA SUMMARY:   Cognitive/Behavioral Status:           Perception: Appears intact  Perseveration: No perseveration noted  Safety/Judgement: Awareness of environment     Functional Mobility and Transfers for ADLs:  Bed Mobility:  Sit to Supine: Assist x1;Additional time;Minimum assistance (support of R LE)     Transfers:  Sit to Stand: Contact guard assistance; Additional time; Adaptive equipment (cues for hand placement)  Functional Transfers  Toilet Transfer : Assist x1;Additional time;Contact guard assistance (with BSC setup over toilet)     Balance:  Sitting: Intact  Standing: Impaired; With support  Standing - Static: Good  Standing - Dynamic : Fair (using RW)     ADL Intervention:     Pt educated on safety, proper hand placement, sequencing and RW use during functional mobility, transfers and ADL tasks. Toileting  Bladder Hygiene: Contact guard assistance  Clothing Management: Maximum assistance     Cognitive Retraining  Safety/Judgement: Awareness of environment        Pain:  Pain Scale 1: Numeric (0 - 10)  Pain Intensity 1: 3  Pain Location 1: Hip  Pain Orientation 1: Right  Pain Description 1: Aching  Pain Intervention(s) 1: Medication (see MAR)  Activity Tolerance:   VSS  Please refer to the flowsheet for vital signs taken during this treatment.   After treatment:   [X] Patient left in no apparent distress sitting up in chair  [ ] Patient left in no apparent distress in bed  [X] Call bell left within reach  [X] Nursing notified  [ ] Caregiver present  [ ] Bed alarm activated      COMMUNICATION/COLLABORATION:   The patients plan of care was discussed with: Physical Therapy Assistant, Registered Nurse and Rehabilitation Attendant     Simona Romero OT  Time Calculation: 23 mins

## 2017-10-03 NOTE — PROGRESS NOTES
Hospital to [facility]                                                                        71 y.o.   female    [address]   Room: 82 [facility] 3 ORTHOPEDICS  Unit Phone# :  [phone]      [address]  \[facility] [place] [facility]\"" [address]  [address] [address]  Dept: [phone]  Loc: [phone]                    SITUATION     Admitted:  9/30/2017         Attending Provider:  Sherice Velazquez MD       Consultations:  IP CONSULT TO ORTHOPEDIC SURGERY    PCP:  None   None    Treatment Team: Attending Provider: Sherice Velazquez MD; Consulting Provider: Frankie Choudhury MD; Consulting Provider: Serge Muller MD    Admitting Dx:  right hip fracture  Closed right hip fracture ([address]       Principal Problem: <principal problem not specified>    2 Days Post-Op of   Procedure(s):  RIGHT FEMUR INSERTION INTRA MEDULLARY NAIL   BY: Frankie Choudhury MD             ON: 10/1/2017                  Code Status: Full Code                Advance Directives:   Advance Care Planning 9/30/2017   Patient's Healthcare Decision Maker is: Legal Next of Kin   Confirm Advance Directive None    (Send w/patient)   No Doesnt Have       Isolation:  There are currently no Active Isolations       MDRO: No current active infections    Pain Medications given:  1500    Last dose: oxycodone 5mg at      Special Equipment needed: yes  Type of equipment: walker      (Not currently on dialysis)  (Not currently on dialysis)  (Not currently on dialysis)     BACKGROUND     Allergies:  No Known Allergies    History reviewed. No pertinent past medical history. Past Surgical History:   Procedure Laterality Date    HX GYN       hysterctomy    HX ORTHOPAEDIC      foot surgery       No prescriptions prior to admission. Hard scripts included in transfer packet yes    Vaccinations: There is no immunization history on file for this patient.     Readmission Risks:    Known Risks: fall        The [employer] Index tool is an evidenced based tool that has more automatic generated information. The tool looks at many different items such as the age of the patient, how many times they were admitted in the last calendar year, current length of stay in the hospital and their diagnosis. All of these items are pulled automatically from information documented in the chart from various places and will generate a score that predicts whether a patient is at low (less than 13), medium (13-20) or high (21 or greater) risk of being readmitted.         ASSESSMENT                Temp: 98.7 °F (37.1 °C) (10/03/17 1108) Pulse (Heart Rate): 91 (10/03/17 1108)     Resp Rate: 18 (10/03/17 1108)           BP: 124/52 (10/03/17 1108)     O2 Sat (%): 97 % (10/03/17 1108)     Weight: 72.3 kg (159 lb 6.3 oz)    Height: 5' 7\" (170.2 cm) (09/30/17 1231)       If above not within 1 hour of discharge:    BP:_____  P:____  R:____ T:_____ O2 Sat: ___%  O2: ______    Active Orders   Diet    DIET REGULAR         Orientation: oriented to time, place, person and situation     Active Behaviors: None                                   Active Lines/Drains:  (Peg Tube / Bahena / CL or S/L?): no    Urinary Status: Voiding     Last BM: Last Bowel Movement Date: 09/30/17     Skin Integrity: Incision (comment)   Wound Hip Right-DRESSING STATUS: Clean, dry, and intact    Wound Hip Right-DRESSING TYPE: Transparent film    Mobility: Slightly limited   Weight Bearing Status: PWB (Partial Weight Bearing %)      Gait Training  Assistive Device: Gait belt, Walker, rolling  Ambulation - Level of Assistance: Contact guard assistance  Distance (ft): 25 Feet (ft)         Lab Results   Component Value Date/Time    Glucose 120 10/02/2017 05:49 AM    Hemoglobin A1c 5.2 10/01/2017 04:10 AM    INR 1.1 09/30/2017 01:12 PM    HGB 8.0 10/02/2017 05:49 AM    HGB 9.0 10/01/2017 04:10 AM        RECOMMENDATION     See After Visit Summary (AVS) for:  · Discharge instructions  · After [address]   · Special equipment needed (entered pre-discharge by Care Management)  · Medication Reconciliation    · Follow up Appointment(s)         Report given/sent by:  Doron Verdugo RN                    Verbal report given to: Ronit  FAXED to:  paper copy given to transport           Estimated discharge time:  10/3/2017 at 1600

## 2017-10-03 NOTE — PROGRESS NOTES
Hospitalist Progress Note    NAME: Eleazar Carrera   :  1945   MRN:  [id number]       Assessment / Plan:  Right hip fracture S/p right femur insertion intramedullary nail.  -pain controlled. Post operative care per orthopedics. -on oxycodone and dilaudid for pain control    Border line hypotension  -improved now    Anemia likely post operative blood loss  -monitor Hb daily. Transfuse to keep Hb more than 7      No acute issues, pain controlled. Advance diet as tolerated. Body mass index is 24.96 kg/(m^2). Code status: Full  DVT prophylaxis: On asa per ortho     Dispo: Inpatient Rehab or SNF pending placement     Subjective: Reports pain is controlled. Slept well last nite. Blood pressure is improved. No issues over nite. She was not started on post operative orders erroneously per ortho NP. Chief Complaint / Reason for Physician Visit  No complaints    Review of Systems:  Symptom Y/N Comments  Symptom Y/N Comments   Fever/Chills n   Chest Pain n    Poor Appetite    Edema     Cough n   Abdominal Pain n    Sputum    Joint Pain     SOB/ALBERTO n   Pruritis/Rash     Nausea/vomit n   Tolerating PT/OT     Diarrhea    Tolerating Diet     Constipation    Other       Could NOT obtain due to:      Objective:     VITALS:   Last 24hrs VS reviewed since prior progress note. Most recent are:  Patient Vitals for the past 24 hrs:   Temp Pulse Resp BP SpO2   10/02/17 1915 98.2 °F (36.8 °C) 84 - 121/59 100 %   10/02/17 1443 97.5 °F (36.4 °C) 70 - 103/47 100 %   10/02/17 1013 97.9 °F (36.6 °C) 72 - 112/48 97 %   10/02/17 0700 98 °F (36.7 °C) 66 - 109/54 98 %   10/02/17 0307 - 76 - 116/56 -   10/01/17 2350 98.1 °F (36.7 °C) 70 18 90/41 98 %   10/01/17 2048 97.7 °F (36.5 °C) - - - -   10/01/17 2046 - 67 - 104/51 100 %         PHYSICAL EXAM:  General: Alert, cooperative, no acute distress    EENT:  Anicteric sclerae. Resp:  CTA bilaterally, no wheezing or rales.   No accessory muscle use  CV:  Regular  rhythm,  No edema  GI:  Soft, Non distended, Non tender.  +Bowel sounds  Neurologic:  Alert and oriented X 3, normal speech,   Psych:   Good insight. Not anxious nor agitated  Skin:  No rashes. No jaundice, incision c/d/i      Current Facility-Administered Medications:     aspirin chewable tablet 81 mg, 81 mg, Oral, BID, Hansel Rowe, DENISE, 81 mg at 10/02/17 1813    famotidine (PEPCID) tablet 20 mg, 20 mg, Oral, BID, Hansel Rowe, NP, 20 mg at 10/02/17 1812    oxyCODONE IR (ROXICODONE) tablet 5-10 mg, 5-10 mg, Oral, Q3H PRN, Hansel Rowe NP, 5 mg at 10/02/17 1812    HYDROmorphone (PF) (DILAUDID) injection 0.5 mg, 0.5 mg, IntraVENous, Q4H PRN, Zeferino House MD, 0.5 mg at 10/01/17 1145    polyethylene glycol (MIRALAX) packet 17 g, 17 g, Oral, DAILY, Zeferino House MD, 17 g at 10/02/17 0855    cyclobenzaprine (FLEXERIL) tablet 10 mg, 10 mg, Oral, TID PRN, Chito Bender MD, 10 mg at 10/02/17 0309    sodium chloride (NS) flush 5-10 mL, 5-10 mL, IntraVENous, Q8H, Agapito Hamman, DENISE, 10 mL at 10/02/17 1907    sodium chloride (NS) flush 5-10 mL, 5-10 mL, IntraVENous, PRN, Agapito Hamman, NP    acetaminophen (TYLENOL) tablet 650 mg, 650 mg, Oral, Q6H, Andrew Hamman, NP, 650 mg at 10/02/17 1430    naloxone [facility]) injection 0.4 mg, 0.4 mg, IntraVENous, PRN, Agapito Hamman, NP    ondansetron [facility]) injection 4 mg, 4 mg, IntraVENous, Q6H PRN, Agapito Hamman, DENISE, 4 mg at 10/01/17 1146    labetalol (NORMODYNE;TRANDATE) injection 10 mg, 10 mg, IntraVENous, Q6H PRN, Luzmaria Angel, MD    ________________________________________________________________________  Care Plan discussed with:    Comments   Patient y 8 min    Family  y 5 min    RN y 5 min    Care Manager y 11 min    Consultant  y 8 min Ortho                     y Multidiciplinary team rounds were held today with , nursing, pharmacist and clinical coordinator.   Patient's plan of care was discussed; medications were reviewed and discharge planning was addressed.      ________________________________________________________________________  Total NON critical care TIME:  35  Minutes    Total CRITICAL CARE TIME Spent:   Minutes non procedure based      Comments   >50% of visit spent in counseling and coordination of care y    ________________________________________________________________________  Yeimy Ruiz MD       Recent Labs      10/02/17   0549  10/01/17   0410  09/30/17   1312   WBC  8.2  7.2  7.1   HGB  8.0*  9.0*  10.9*   HCT  24.1*  27.1*  31.9*   PLT  107*  135*  153     Recent Labs      10/02/17   0549  10/01/17   0410  09/30/17   1408  09/30/17   1312   NA  137  138  140   --    K  4.5  4.3  3.9   --    CL  106  108  109*   --    CO2  26  24  24   --    GLU  120*  117*  112*   --    BUN  11  13  16   --    CREA  0.79  0.64  0.73   --    CA  7.8*  7.5*  8.2*   --    MG   --   2.1  2.1   --    ALB   --   2.7*  3.4*   --    TBILI   --   0.4  0.5   --    SGOT   --   10*  15   --    ALT   --   18  21   --    INR   --    --    --   1.1       Signed: Yeimy Ruiz MD

## 2017-10-03 NOTE — PROGRESS NOTES
Ortho/ NeuroSurgery NP Note    POD# 2  s/p RIGHT FEMUR INSERTION INTRA MEDULLARY NAIL     Pt resting in bed. No complaints. Expected post-op pain. Pain med needs increased after therapy yesterday. Continue to stay ahead of pain control today. VSS Afebrile. Patient is voiding in adequate amounts. Labs  Lab Results   Component Value Date/Time    HGB 8.0 10/02/2017 05:49 AM        Body mass index is 24.96 kg/(m^2). BMI greater than 30 is classified as obesity and greater than 40 is classified as morbid obesity. Dressing c.d.i, cryotherapy   Calves soft and supple; No pain with passive stretch  Sensation and motor intact  SCDs for mechanical DVT proph while in bed     PLAN:  1) PT BID TTWB  2) Aspirin 81 mg PO BID for DVT Prophylaxis  3) Pepcid for GI Prophylaxis. 4) Plan d/c [facility] if accepted.      Karla Sy NP

## 2017-10-03 NOTE — PROGRESS NOTES
Bedside and Verbal shift change report given to [address] (oncoming nurse) by Jonathan Hoffman (offgoing nurse). Report included the following information SBAR, Kardex, MAR and Recent Results.

## 2017-10-03 NOTE — PROGRESS NOTES
[facility] accepted pt the patient is going to room 126. CM notified pt . Provider info updated to pt AVS. [facility] ready to accept pt after 2 PM.    Floor nurse can call report to [facility] at [phone], please send most recent MAR, copy of d/c instructions, SNF hand off report, and any prescriptions that need to go with the pt. CM notified pt attending physician the d/c plan. 3.00 PM  [facility] 2nd letter given copy attached to pt bedside chart. Care Management Interventions  PCP Verified by CM:  Yes  Discharge Durable Medical Equipment: No  Health Maintenance Reviewed: Yes  Physical Therapy Consult: Yes  Occupational Therapy Consult: Yes  Speech Therapy Consult: No  Freedom of Choice Offered: Yes  Discharge Location  Discharge Placement: Rehab hospital/unit acute    Karyle Alt  Ext [phone]

## 2017-10-03 NOTE — DISCHARGE INSTRUCTIONS
Diet: heart healthy  Activity: Per rehab physician's orders    [facility]    Dis[facility] Instruction Sheet: Hip Fracture Repair    Dr. Adrianne Milligan    Blood Clot Prevention   Aspirin 81 mg twice daily for 30 days. Take pepcid while on Aspirin to prevent stomach upset. Pain control:  Medications   Typically, we will prescribe a narcotic usually 1-2 tabs every three to four hours as needed for your pain. Most patients need this only for the first few weeks.  You should discontinue this as the pain decreases.  Some of these medications contain a large dose of Tylenol (acetaminophen). Therefore, you should consult your pharmacist before taking any additional Tylenol at the same time. You should not drive while taking any narcotic pain medications. Take your pain medications with food to prevent nausea! Your last dose of pain medication in the hospital was given @ :__________    [address] will be discharged home with ice/gel packs.  Continue using these regularly to minimize pain and swelling, especially after physical activity. Constipation   Pain medication and anesthesia can be constipating-this can be prevented by gentle physical activity and drinking plenty of fluid.  It should be treated with over-the-counter medications such as Dulcolax, Miralax, Magnesium Citrate and/or Fleets enema.  You should have a bowel movement at least every other day following surgery. Incision care   You will be discharged with a transparent and waterproof dressing over your incision. o This should remain in place for 5-7 days after discharge.   You will be given one additional dressing to use at home in 5-7 days if you are still having drainage   You may shower with this dressing in place after you are discharged. o After showering pat the dressing/incision dry.  When the incision is no longer draining, it may be left open to the air.  DO NOT rub the incision.  DO NOT take a tub bath or go swimming until cleared by your doctor.  DO NOT apply lotions, oils, or creams to incision. To increase and promote healing:   Stop Smoking (or at least cut back on smoking).  Eat a well-balanced diet (high in protein and vitamin C)   If your appetite is poor, consider nutritional supplements like Ensure, Glucerna, or Morris Instant Breakfast.   If you are diabetic, controlling you blood sugars is very important to prevent infection and promote wound healing. Nutrition:   If you were on a supplement such as Ensure or Glucerna) while in the hospital, please continue using them with each meal for the next 30 days.  Eat a well-balanced diet - High in protein, high in vitamins and minerals, especially vitamin C and zinc.     Home Health:   If you have staples a home health nurse will remove them in about 10 days.  Physical Therapy will come to your home to continue training for walking, transferring and exercises    Physical Activity     You can weight bear as tolerated on your new hip TTWB.  Bed-rest may slow your recovery.  Use your walker and take short walks about every 2 hours.  Increase your distance each day.  NO DRIVING until told to do so. Warning signs: Please call your physician immediately at [phone] if you have   Bleeding from incision that is constant.  Change in mental status (unusual behavior or confusion)   If your incision develops redness or swelling   Change in wound drainage (increase in amount, color, or foul odor)   Temperature over 101.5 degrees Fahrenheit    Pain in the calf of your leg   Tenderness or redness in the calf of your leg   Increased swelling of the thigh, ankle, calf, or foot.     Emergency: CALL 911 if you have   Shortness of breath   Chest pain   Localized chest pain when coughing or taking a deep breath    Follow-up    Please call your surgeons office at [phone] for a follow up appointment.   o You should call as soon as you get home or the next day after discharge. o Ask to make an appointment in 2 weeks.  You can return to work when cleared by a physician. During normal business hours you may reach Dr. Deni Cruz' team directly at [phone] if you have concerns or questions.

## 2017-10-03 NOTE — PROGRESS NOTES
Problem: Mobility Impaired (Adult and Pediatric)  Goal: *Acute Goals and Plan of Care (Insert Text)  Physical Therapy Goals  Initiated 10/2/2017  1. Patient will move from supine to sit and sit to supine , scoot up and down and roll side to side in bed with independence within 7 day(s). 2. Patient will transfer from bed to chair and chair to bed with modified independence using the least restrictive device within 7 day(s). 3. Patient will perform sit to stand with modified independence within 7 day(s). 4. Patient will ambulate with modified independence for 50 feet with the least restrictive device within 7 day(s). 5. Patient will ascend/descend 12 stairs with 1 handrail(s) with supervision/set-up within 7 day(s). PHYSICAL THERAPY TREATMENT  Patient: Luiz Ty (89 y.o. female)  Date: 10/3/2017  Diagnosis: Closed right hip fracture (HCC)      Procedure(s) (LRB):  RIGHT FEMUR INSERTION INTRA MEDULLARY NAIL (Right) 2 Days Post-Op      Precautions: TTWB  Chart, physical therapy assessment, plan of care and goals were reviewed. ASSESSMENT: pt progressing towards goals, no LOB or SOB, very compliant with TTWB, fatigues quickly, vc's for safety and proper RW use. Progression toward goals:  [ ]      Improving appropriately and progressing toward goals  [X]      Improving slowly and progressing toward goals  [ ]      Not making progress toward goals and plan of care will be adjusted       PLAN:  Patient continues to benefit from skilled intervention to address the above impairments. Continue treatment per established plan of care.   Discharge Recommendations:  Inpatient Rehab  Further Equipment Recommendations for Discharge:  bedside commode and rolling walker       OBJECTIVE DATA SUMMARY:      Critical Behavior:  Neurologic State: Alert  Orientation Level: Appropriate for age  Cognition: Appropriate decision making, Appropriate for age attention/concentration, Appropriate safety awareness, Follows commands  Safety/Judgement: Awareness of environment      Functional Mobility Training:  Bed Mobility:  Sit to Supine: Assist x1;Additional time;Minimum assistance (support of R LE)     Transfers:  Sit to Stand: Contact guard assistance; Additional time  Stand to Sit: Contact guard assistance  Interventions: Tactile cues; Verbal cues  Level of Assistance: Contact guard assistance      Balance:  Sitting: Intact; Without support  Standing: Intact; With support  Standing - Static: Good;Constant support  Standing - Dynamic : Fair      Ambulation/Gait Training:  Distance (ft): 25 Feet (ft)  Assistive Device: Gait belt;Walker, rolling  Ambulation - Level of Assistance: Contact guard assistance  Gait Abnormalities: Antalgic;Decreased step clearance  Right Side Weight Bearing:  Toe touch  Left Side Weight Bearing: Full  Base of Support: Shift to left  Stance: Right decreased  Speed/Jenna: Slow  Step Length: Left shortened     Therapeutic Exercises:   sitting  EXERCISE   Sets   Reps   Active Active Assist   Passive   Comments   Ankle pumps 1 10 [X] [ ] [ ] bilat   Heel raises 1 10 [X] [ ] [ ] \"   Toe tap 1 10 [X] [ ] [ ] \"   Knee ext 1 8 [ ] [ ] [X] \"   Hip flex 1 10 [X] [ ] [X] \"      Pain:  Pain Scale 1: Numeric (0 - 10)  Pain Intensity 1: 4  Pain Location 1: Hip  Pain Orientation 1: Right  Pain Description 1: Aching  Pain Intervention(s) 1: Medication (see MAR)      Activity Tolerance: poor     After treatment:   [X] Patient left in no apparent distress sitting up in chair  [ ] Patient left in no apparent distress in bed  [X] Call bell left within reach  [X] Nursing notified  [ ] Caregiver present  [X] Bed alarm activated      COMMUNICATION/COLLABORATION:   The patients plan of care was discussed with: Registered Nurse     Daysi Hernandez PTA   Time Calculation: 25 mins

## 2017-10-04 LAB
25(OH)D3 SERPL-MCNC: 19.5 NG/ML (ref 30–100)
ANION GAP SERPL CALC-SCNC: 7 MMOL/L (ref 5–15)
APPEARANCE UR: CLEAR
BACTERIA URNS QL MICRO: NEGATIVE /HPF
BILIRUB UR QL: NEGATIVE
BUN SERPL-MCNC: 11 MG/DL (ref 6–20)
BUN/CREAT SERPL: 16 (ref 12–20)
CALCIUM SERPL-MCNC: 8 MG/DL (ref 8.5–10.1)
CHLORIDE SERPL-SCNC: 103 MMOL/L (ref 97–108)
CO2 SERPL-SCNC: 28 MMOL/L (ref 21–32)
COLOR UR: NORMAL
CREAT SERPL-MCNC: 0.67 MG/DL (ref 0.55–1.02)
EPITH CASTS URNS QL MICRO: NORMAL /LPF
ERYTHROCYTE [DISTWIDTH] IN BLOOD BY AUTOMATED COUNT: 13.5 % (ref 11.5–14.5)
GLUCOSE SERPL-MCNC: 102 MG/DL (ref 65–100)
GLUCOSE UR STRIP.AUTO-MCNC: NEGATIVE MG/DL
HCT VFR BLD AUTO: 23.5 % (ref 35–47)
HGB BLD-MCNC: 8.1 G/DL (ref 11.5–16)
HGB UR QL STRIP: NEGATIVE
HYALINE CASTS URNS QL MICRO: NORMAL /LPF (ref 0–5)
KETONES UR QL STRIP.AUTO: NEGATIVE MG/DL
LEUKOCYTE ESTERASE UR QL STRIP.AUTO: NEGATIVE
MCH RBC QN AUTO: 31.9 PG (ref 26–34)
MCHC RBC AUTO-ENTMCNC: 34.5 G/DL (ref 30–36.5)
MCV RBC AUTO: 92.5 FL (ref 80–99)
NITRITE UR QL STRIP.AUTO: NEGATIVE
PH UR STRIP: 6 [PH] (ref 5–8)
PLATELET # BLD AUTO: 141 K/UL (ref 150–400)
POTASSIUM SERPL-SCNC: 4 MMOL/L (ref 3.5–5.1)
PROT UR STRIP-MCNC: NEGATIVE MG/DL
RBC # BLD AUTO: 2.54 M/UL (ref 3.8–5.2)
RBC #/AREA URNS HPF: NORMAL /HPF (ref 0–5)
SODIUM SERPL-SCNC: 138 MMOL/L (ref 136–145)
SP GR UR REFRACTOMETRY: 1.01 (ref 1–1.03)
UROBILINOGEN UR QL STRIP.AUTO: 0.2 EU/DL (ref 0.2–1)
WBC # BLD AUTO: 7.1 K/UL (ref 3.6–11)
WBC URNS QL MICRO: NORMAL /HPF (ref 0–4)

## 2017-10-04 PROCEDURE — 87086 URINE CULTURE/COLONY COUNT: CPT | Performed by: PHYSICAL MEDICINE & REHABILITATION

## 2017-10-04 PROCEDURE — 81001 URINALYSIS AUTO W/SCOPE: CPT | Performed by: PHYSICAL MEDICINE & REHABILITATION

## 2017-10-04 PROCEDURE — 87186 SC STD MICRODIL/AGAR DIL: CPT | Performed by: PHYSICAL MEDICINE & REHABILITATION

## 2017-10-04 PROCEDURE — 80048 BASIC METABOLIC PNL TOTAL CA: CPT | Performed by: PHYSICAL MEDICINE & REHABILITATION

## 2017-10-04 PROCEDURE — 87077 CULTURE AEROBIC IDENTIFY: CPT | Performed by: PHYSICAL MEDICINE & REHABILITATION

## 2017-10-04 PROCEDURE — 74011250637 HC RX REV CODE- 250/637: Performed by: PHYSICAL MEDICINE & REHABILITATION

## 2017-10-04 PROCEDURE — 85027 COMPLETE CBC AUTOMATED: CPT | Performed by: PHYSICAL MEDICINE & REHABILITATION

## 2017-10-04 PROCEDURE — 36415 COLL VENOUS BLD VENIPUNCTURE: CPT | Performed by: PHYSICAL MEDICINE & REHABILITATION

## 2017-10-04 PROCEDURE — 82306 VITAMIN D 25 HYDROXY: CPT | Performed by: PHYSICAL MEDICINE & REHABILITATION

## 2017-10-04 RX ORDER — HYDRALAZINE HYDROCHLORIDE 25 MG/1
25 TABLET, FILM COATED ORAL
Status: DISCONTINUED | OUTPATIENT
Start: 2017-10-04 | End: 2017-10-14 | Stop reason: HOSPADM

## 2017-10-04 RX ORDER — HYDROCODONE BITARTRATE AND ACETAMINOPHEN 5; 325 MG/1; MG/1
1 TABLET ORAL 2 TIMES DAILY
Status: DISCONTINUED | OUTPATIENT
Start: 2017-10-04 | End: 2017-10-06

## 2017-10-04 RX ORDER — HYDROCODONE BITARTRATE AND ACETAMINOPHEN 5; 325 MG/1; MG/1
1 TABLET ORAL
Status: DISCONTINUED | OUTPATIENT
Start: 2017-10-04 | End: 2017-10-06

## 2017-10-04 RX ORDER — ERGOCALCIFEROL 1.25 MG/1
50000 CAPSULE ORAL
Status: DISCONTINUED | OUTPATIENT
Start: 2017-10-04 | End: 2017-10-14 | Stop reason: HOSPADM

## 2017-10-04 RX ADMIN — ACETAMINOPHEN 650 MG: 325 TABLET ORAL at 00:31

## 2017-10-04 RX ADMIN — FAMOTIDINE 20 MG: 20 TABLET ORAL at 00:31

## 2017-10-04 RX ADMIN — HYDROCODONE BITARTRATE AND ACETAMINOPHEN 1 TABLET: 5; 325 TABLET ORAL at 17:16

## 2017-10-04 RX ADMIN — ACETAMINOPHEN 650 MG: 325 TABLET ORAL at 17:17

## 2017-10-04 RX ADMIN — ASPIRIN 81 MG 81 MG: 81 TABLET ORAL at 10:13

## 2017-10-04 RX ADMIN — ACETAMINOPHEN 650 MG: 325 TABLET ORAL at 13:25

## 2017-10-04 RX ADMIN — POLYETHYLENE GLYCOL 3350 17 G: 17 POWDER, FOR SOLUTION ORAL at 09:00

## 2017-10-04 RX ADMIN — OXYCODONE HYDROCHLORIDE 10 MG: 5 TABLET ORAL at 06:22

## 2017-10-04 RX ADMIN — DOCUSATE SODIUM AND SENNOSIDES 1 TABLET: 8.6; 5 TABLET, FILM COATED ORAL at 17:17

## 2017-10-04 RX ADMIN — HYDROCODONE BITARTRATE AND ACETAMINOPHEN 1 TABLET: 5; 325 TABLET ORAL at 12:20

## 2017-10-04 RX ADMIN — DOCUSATE SODIUM AND SENNOSIDES 1 TABLET: 8.6; 5 TABLET, FILM COATED ORAL at 10:13

## 2017-10-04 RX ADMIN — ASPIRIN 81 MG 81 MG: 81 TABLET ORAL at 17:16

## 2017-10-04 RX ADMIN — ASPIRIN 81 MG 81 MG: 81 TABLET ORAL at 00:31

## 2017-10-04 RX ADMIN — DOCUSATE SODIUM AND SENNOSIDES 1 TABLET: 8.6; 5 TABLET, FILM COATED ORAL at 00:31

## 2017-10-04 RX ADMIN — ACETAMINOPHEN 650 MG: 325 TABLET ORAL at 10:13

## 2017-10-04 NOTE — DISCHARGE SUMMARY
Hospitalist Discharge Summary     Patient ID:  Gabe Vallejo  [id number]  71 y.o.  1945    PCP on record: None    Admit date: 9/30/2017  Discharge date and time: 10/3/2017      DISCHARGE DIAGNOSIS:    Right hip fracture S/p right femur insertion intramedullary nail. Border line hypotension now improved   Anemia likely post operative blood loss        CONSULTATIONS:  IP CONSULT TO ORTHOPEDIC SURGERY    Excerpted HPI from H&P of Jan Downing MD:  Gabe Vallejo is a 71 y.o.  female  without significant PMHx, presenting per EMS to ED c/o constant 9/10 sharp/non-radiating right hip pain s/p GLF PTA. Pt reports pain is worsened with any movement of her right hip. She notes she has been unable to ambulate secondary to pain since the incident. Pt specifically denies any fevers, chills, nausea, vomiting, chest pain, shortness of breath, headache, rash, diarrhea, sweating or weight loss. At this time patient is lying in bed c/o right hip pain, she trip and fell today, denies chest pain, no SOB, no N/V no diarrhea, no fever, no cough, no urinary symptoms, no other associated symptoms. We were asked to admit for work up and evaluation of the above problems.        ______________________________________________________________________  DISCHARGE SUMMARY/HOSPITAL COURSE:  for full details see H&P, daily progress notes, labs, consult notes. Pt was admitted to the hospital and was treated for following medical problems. Right hip fracture S/p right femur insertion intramedullary nail. Pt admitted and started on pain control with narcotics and a consult was placed  For ortho and was seen by ortho and underwent Rt IM nailing with no complications. Post operatively pt did well with no complications except for mild drop in Hb and  Few episodes of low blood pressure which improved. Ortho has cleared the pt  To be discharged for out pt follow up.  Pt was started on Aspirin for DVT prophylaxis  By orthopedics. Rest of her medical problems remained stable during hospitalization.               _______________________________________________________________________  Patient seen and examined by me on discharge day. Pertinent Findings:  Gen:    Not in distress  Chest: Clear lungs  CVS:   Regular rhythm. No edema  Abd:  Soft, not distended, not tender  Neuro:  Alert,  Awake   _______________________________________________________________________  DISCHARGE MEDICATIONS:   Discharge Medication List as of 10/3/2017  3:34 PM      START taking these medications    Details   aspirin 81 mg chewable tablet Take 1 Tab by mouth two (2) times a day for 30 days. , Print, Disp-60 Tab, R-0      oxyCODONE IR (ROXICODONE) 5 mg immediate release tablet Take 1 Tab by mouth every six (6) hours as needed for up to 7 days. Max Daily Amount: 20 mg., Print, Disp-20 Tab, R-0      polyethylene glycol (MIRALAX) 17 gram packet Take 1 Packet by mouth daily as needed. , Print, Disp-15 Packet, R-0      acetaminophen (TYLENOL) 325 mg tablet Take 2 Tabs by mouth every six (6) hours for 14 days. , No Print, Disp-112 Tab, R-0      famotidine (PEPCID) 20 mg tablet Take 1 Tab by mouth two (2) times a day for 30 days. , No Print, Disp-60 Tab, R-0      senna-docusate (SENNA PLUS) 8.6-50 mg per tablet Take 1 Tab by mouth two (2) times a day., No Print, Disp-60 Tab, R-0             My Recommended Diet, Activity, Wound Care, and follow-up labs are listed in the patient's Discharge Insturctions which I have personally completed and reviewed.     _______________________________________________________________________  DISPOSITION:    Home with Family:    Home with HH/PT/OT/RN:    SNF/LTC:    DESHAUN: y   OTHER:        Condition at Discharge:  Stable  _______________________________________________________________________  Follow up with:   PCP : None  Follow-up Information     Follow up With Details Comments Contact Info    [facility] [address] On 10/3/2017 This is your rehab provider.  [address] [place] [address]  [phone]    None   None ([phone]) Patient stated that they have no PCP      Please provide list of Pcp's       Coni Coats MD Schedule an appointment as soon as possible for a visit in 2 weeks  [address] [facility]  [address] [facility],[address]  [phone]                Total time in minutes spent coordinating this discharge (includes going over instructions, follow-up, prescriptions, and preparing report for sign off to her PCP) :  35  minutes    Signed:  Tara Kendall MD

## 2017-10-05 PROCEDURE — 74011250637 HC RX REV CODE- 250/637: Performed by: PHYSICAL MEDICINE & REHABILITATION

## 2017-10-05 RX ORDER — ACETAMINOPHEN 325 MG/1
650 TABLET ORAL
Status: DISCONTINUED | OUTPATIENT
Start: 2017-10-05 | End: 2017-10-14 | Stop reason: HOSPADM

## 2017-10-05 RX ORDER — HYDROCODONE BITARTRATE AND ACETAMINOPHEN 10; 325 MG/1; MG/1
1 TABLET ORAL
Status: DISCONTINUED | OUTPATIENT
Start: 2017-10-05 | End: 2017-10-06

## 2017-10-05 RX ORDER — HYDROCODONE BITARTRATE AND ACETAMINOPHEN 10; 325 MG/1; MG/1
2 TABLET ORAL
Status: DISCONTINUED | OUTPATIENT
Start: 2017-10-05 | End: 2017-10-06

## 2017-10-05 RX ADMIN — ACETAMINOPHEN 650 MG: 325 TABLET ORAL at 20:40

## 2017-10-05 RX ADMIN — HYDROCODONE BITARTRATE AND ACETAMINOPHEN 1 TABLET: 5; 325 TABLET ORAL at 12:26

## 2017-10-05 RX ADMIN — HYDROCODONE BITARTRATE AND ACETAMINOPHEN 1 TABLET: 5; 325 TABLET ORAL at 04:38

## 2017-10-05 RX ADMIN — ASPIRIN 81 MG 81 MG: 81 TABLET ORAL at 09:03

## 2017-10-05 RX ADMIN — ASPIRIN 81 MG 81 MG: 81 TABLET ORAL at 16:36

## 2017-10-05 RX ADMIN — POLYETHYLENE GLYCOL 3350 17 G: 17 POWDER, FOR SOLUTION ORAL at 09:02

## 2017-10-05 RX ADMIN — DOCUSATE SODIUM AND SENNOSIDES 1 TABLET: 8.6; 5 TABLET, FILM COATED ORAL at 16:37

## 2017-10-05 RX ADMIN — ACETAMINOPHEN 650 MG: 325 TABLET ORAL at 00:25

## 2017-10-05 RX ADMIN — ACETAMINOPHEN 650 MG: 325 TABLET ORAL at 16:39

## 2017-10-05 RX ADMIN — HYDROCODONE BITARTRATE AND ACETAMINOPHEN 1 TABLET: 10; 325 TABLET ORAL at 23:32

## 2017-10-05 RX ADMIN — ACETAMINOPHEN 650 MG: 325 TABLET ORAL at 11:17

## 2017-10-05 RX ADMIN — DOCUSATE SODIUM AND SENNOSIDES 1 TABLET: 8.6; 5 TABLET, FILM COATED ORAL at 09:03

## 2017-10-05 RX ADMIN — HYDROCODONE BITARTRATE AND ACETAMINOPHEN 1 TABLET: 5; 325 TABLET ORAL at 09:00

## 2017-10-06 LAB
BACTERIA SPEC CULT: ABNORMAL
CC UR VC: ABNORMAL
SERVICE CMNT-IMP: ABNORMAL

## 2017-10-06 PROCEDURE — 74011250637 HC RX REV CODE- 250/637: Performed by: PHYSICAL MEDICINE & REHABILITATION

## 2017-10-06 RX ORDER — HYDROCODONE BITARTRATE AND ACETAMINOPHEN 10; 325 MG/1; MG/1
2 TABLET ORAL
Status: DISCONTINUED | OUTPATIENT
Start: 2017-10-06 | End: 2017-10-14 | Stop reason: HOSPADM

## 2017-10-06 RX ORDER — ADHESIVE BANDAGE
30 BANDAGE TOPICAL
Status: ACTIVE | OUTPATIENT
Start: 2017-10-06 | End: 2017-10-06

## 2017-10-06 RX ORDER — HYDROCODONE BITARTRATE AND ACETAMINOPHEN 10; 325 MG/1; MG/1
1 TABLET ORAL
Status: DISCONTINUED | OUTPATIENT
Start: 2017-10-06 | End: 2017-10-14 | Stop reason: HOSPADM

## 2017-10-06 RX ORDER — MAGNESIUM CITRATE
148 SOLUTION, ORAL ORAL
Status: COMPLETED | OUTPATIENT
Start: 2017-10-06 | End: 2017-10-08

## 2017-10-06 RX ORDER — HYDROCODONE BITARTRATE AND ACETAMINOPHEN 10; 325 MG/1; MG/1
1 TABLET ORAL 2 TIMES DAILY
Status: DISCONTINUED | OUTPATIENT
Start: 2017-10-06 | End: 2017-10-14 | Stop reason: HOSPADM

## 2017-10-06 RX ADMIN — HYDROCODONE BITARTRATE AND ACETAMINOPHEN 2 TABLET: 10; 325 TABLET ORAL at 16:36

## 2017-10-06 RX ADMIN — ACETAMINOPHEN 650 MG: 325 TABLET ORAL at 20:44

## 2017-10-06 RX ADMIN — HYDROCODONE BITARTRATE AND ACETAMINOPHEN 1 TABLET: 10; 325 TABLET ORAL at 13:46

## 2017-10-06 RX ADMIN — ASPIRIN 81 MG 81 MG: 81 TABLET ORAL at 16:37

## 2017-10-06 RX ADMIN — ASPIRIN 81 MG 81 MG: 81 TABLET ORAL at 08:36

## 2017-10-06 RX ADMIN — POLYETHYLENE GLYCOL 3350 17 G: 17 POWDER, FOR SOLUTION ORAL at 08:35

## 2017-10-06 RX ADMIN — DOCUSATE SODIUM AND SENNOSIDES 1 TABLET: 8.6; 5 TABLET, FILM COATED ORAL at 16:37

## 2017-10-06 RX ADMIN — DOCUSATE SODIUM AND SENNOSIDES 1 TABLET: 8.6; 5 TABLET, FILM COATED ORAL at 08:36

## 2017-10-06 RX ADMIN — HYDROCODONE BITARTRATE AND ACETAMINOPHEN 1 TABLET: 10; 325 TABLET ORAL at 08:36

## 2017-10-06 RX ADMIN — MAGNESIUM HYDROXIDE 30 ML: 400 SUSPENSION ORAL at 08:36

## 2017-10-07 PROCEDURE — 74011250637 HC RX REV CODE- 250/637: Performed by: PHYSICAL MEDICINE & REHABILITATION

## 2017-10-07 RX ADMIN — ACETAMINOPHEN 650 MG: 325 TABLET ORAL at 17:47

## 2017-10-07 RX ADMIN — HYDROCODONE BITARTRATE AND ACETAMINOPHEN 1 TABLET: 10; 325 TABLET ORAL at 21:05

## 2017-10-07 RX ADMIN — ASPIRIN 81 MG 81 MG: 81 TABLET ORAL at 17:47

## 2017-10-07 RX ADMIN — DOCUSATE SODIUM AND SENNOSIDES 1 TABLET: 8.6; 5 TABLET, FILM COATED ORAL at 17:48

## 2017-10-07 RX ADMIN — ERGOCALCIFEROL 50000 UNITS: 1.25 CAPSULE ORAL at 17:50

## 2017-10-07 RX ADMIN — ASPIRIN 81 MG 81 MG: 81 TABLET ORAL at 08:30

## 2017-10-07 RX ADMIN — HYDROCODONE BITARTRATE AND ACETAMINOPHEN 1 TABLET: 10; 325 TABLET ORAL at 08:31

## 2017-10-07 RX ADMIN — DOCUSATE SODIUM AND SENNOSIDES 1 TABLET: 8.6; 5 TABLET, FILM COATED ORAL at 08:30

## 2017-10-07 RX ADMIN — HYDROCODONE BITARTRATE AND ACETAMINOPHEN 1 TABLET: 10; 325 TABLET ORAL at 12:23

## 2017-10-07 RX ADMIN — POLYETHYLENE GLYCOL 3350 17 G: 17 POWDER, FOR SOLUTION ORAL at 08:30

## 2017-10-08 PROCEDURE — 74011250637 HC RX REV CODE- 250/637: Performed by: PHYSICAL MEDICINE & REHABILITATION

## 2017-10-08 RX ORDER — MAGNESIUM CITRATE
148 SOLUTION, ORAL ORAL EVERY 4 HOURS
Status: ACTIVE | OUTPATIENT
Start: 2017-10-08 | End: 2017-10-08

## 2017-10-08 RX ADMIN — ASPIRIN 81 MG 81 MG: 81 TABLET ORAL at 17:32

## 2017-10-08 RX ADMIN — HYDROCODONE BITARTRATE AND ACETAMINOPHEN 1 TABLET: 10; 325 TABLET ORAL at 08:32

## 2017-10-08 RX ADMIN — ASPIRIN 81 MG 81 MG: 81 TABLET ORAL at 08:32

## 2017-10-08 RX ADMIN — HYDROCODONE BITARTRATE AND ACETAMINOPHEN 2 TABLET: 10; 325 TABLET ORAL at 20:58

## 2017-10-08 RX ADMIN — DOCUSATE SODIUM AND SENNOSIDES 1 TABLET: 8.6; 5 TABLET, FILM COATED ORAL at 17:33

## 2017-10-08 RX ADMIN — HYDROCODONE BITARTRATE AND ACETAMINOPHEN 1 TABLET: 10; 325 TABLET ORAL at 13:18

## 2017-10-08 RX ADMIN — MAGESIUM CITRATE 148 ML: 1.75 LIQUID ORAL at 08:38

## 2017-10-09 PROCEDURE — 74011250637 HC RX REV CODE- 250/637: Performed by: PHYSICAL MEDICINE & REHABILITATION

## 2017-10-09 RX ADMIN — HYDROCODONE BITARTRATE AND ACETAMINOPHEN 1 TABLET: 10; 325 TABLET ORAL at 09:48

## 2017-10-09 RX ADMIN — HYDROCODONE BITARTRATE AND ACETAMINOPHEN 1 TABLET: 10; 325 TABLET ORAL at 12:13

## 2017-10-09 RX ADMIN — ASPIRIN 81 MG 81 MG: 81 TABLET ORAL at 17:15

## 2017-10-09 RX ADMIN — POLYETHYLENE GLYCOL 3350 17 G: 17 POWDER, FOR SOLUTION ORAL at 09:48

## 2017-10-09 RX ADMIN — ASPIRIN 81 MG 81 MG: 81 TABLET ORAL at 09:48

## 2017-10-09 RX ADMIN — DOCUSATE SODIUM AND SENNOSIDES 1 TABLET: 8.6; 5 TABLET, FILM COATED ORAL at 09:48

## 2017-10-10 PROCEDURE — 74011250637 HC RX REV CODE- 250/637: Performed by: PHYSICAL MEDICINE & REHABILITATION

## 2017-10-10 RX ADMIN — HYDROCODONE BITARTRATE AND ACETAMINOPHEN 2 TABLET: 10; 325 TABLET ORAL at 00:32

## 2017-10-10 RX ADMIN — HYDROCODONE BITARTRATE AND ACETAMINOPHEN 1 TABLET: 10; 325 TABLET ORAL at 12:29

## 2017-10-10 RX ADMIN — HYDROCODONE BITARTRATE AND ACETAMINOPHEN 1 TABLET: 10; 325 TABLET ORAL at 09:26

## 2017-10-11 PROCEDURE — 74011250637 HC RX REV CODE- 250/637: Performed by: PHYSICAL MEDICINE & REHABILITATION

## 2017-10-11 RX ADMIN — ERGOCALCIFEROL 50000 UNITS: 1.25 CAPSULE ORAL at 17:53

## 2017-10-11 RX ADMIN — HYDROCODONE BITARTRATE AND ACETAMINOPHEN 1 TABLET: 10; 325 TABLET ORAL at 11:12

## 2017-10-12 PROCEDURE — 74011250637 HC RX REV CODE- 250/637: Performed by: PHYSICAL MEDICINE & REHABILITATION

## 2017-10-12 RX ADMIN — HYDROCODONE BITARTRATE AND ACETAMINOPHEN 1 TABLET: 10; 325 TABLET ORAL at 05:00

## 2017-10-14 ENCOUNTER — HOME HEALTH ADMISSION (OUTPATIENT)
Dept: HOME HEALTH SERVICES | Facility: HOME HEALTH | Age: 72
End: 2017-10-14
Payer: MEDICARE

## 2017-10-14 PROCEDURE — 74011250637 HC RX REV CODE- 250/637: Performed by: PHYSICAL MEDICINE & REHABILITATION

## 2017-10-14 RX ADMIN — ACETAMINOPHEN 650 MG: 325 TABLET ORAL at 06:25

## 2017-10-16 ENCOUNTER — HOME CARE VISIT (OUTPATIENT)
Dept: SCHEDULING | Facility: HOME HEALTH | Age: 72
End: 2017-10-16
Payer: MEDICARE

## 2017-10-16 PROCEDURE — 400013 HH SOC

## 2017-10-16 PROCEDURE — G0151 HHCP-SERV OF PT,EA 15 MIN: HCPCS

## 2017-10-16 PROCEDURE — 3331090002 HH PPS REVENUE DEBIT

## 2017-10-16 PROCEDURE — 3331090001 HH PPS REVENUE CREDIT

## 2017-10-17 VITALS
DIASTOLIC BLOOD PRESSURE: 72 MMHG | TEMPERATURE: 97.5 F | RESPIRATION RATE: 15 BRPM | HEIGHT: 65 IN | HEART RATE: 90 BPM | OXYGEN SATURATION: 99 % | SYSTOLIC BLOOD PRESSURE: 124 MMHG

## 2017-10-17 PROCEDURE — 3331090002 HH PPS REVENUE DEBIT

## 2017-10-17 PROCEDURE — 3331090001 HH PPS REVENUE CREDIT

## 2017-10-18 ENCOUNTER — HOME CARE VISIT (OUTPATIENT)
Dept: SCHEDULING | Facility: HOME HEALTH | Age: 72
End: 2017-10-18
Payer: MEDICARE

## 2017-10-18 VITALS
HEART RATE: 88 BPM | DIASTOLIC BLOOD PRESSURE: 80 MMHG | SYSTOLIC BLOOD PRESSURE: 138 MMHG | RESPIRATION RATE: 16 BRPM | TEMPERATURE: 98.9 F | OXYGEN SATURATION: 98 %

## 2017-10-18 PROCEDURE — G0157 HHC PT ASSISTANT EA 15: HCPCS

## 2017-10-18 PROCEDURE — 3331090002 HH PPS REVENUE DEBIT

## 2017-10-18 PROCEDURE — 3331090001 HH PPS REVENUE CREDIT

## 2017-10-19 PROCEDURE — 3331090002 HH PPS REVENUE DEBIT

## 2017-10-19 PROCEDURE — 3331090001 HH PPS REVENUE CREDIT

## 2017-10-20 ENCOUNTER — HOME CARE VISIT (OUTPATIENT)
Dept: SCHEDULING | Facility: HOME HEALTH | Age: 72
End: 2017-10-20
Payer: MEDICARE

## 2017-10-20 VITALS
TEMPERATURE: 98 F | HEART RATE: 77 BPM | SYSTOLIC BLOOD PRESSURE: 132 MMHG | OXYGEN SATURATION: 98 % | DIASTOLIC BLOOD PRESSURE: 76 MMHG

## 2017-10-20 PROCEDURE — 3331090001 HH PPS REVENUE CREDIT

## 2017-10-20 PROCEDURE — G0152 HHCP-SERV OF OT,EA 15 MIN: HCPCS

## 2017-10-20 PROCEDURE — 3331090002 HH PPS REVENUE DEBIT

## 2017-10-21 PROCEDURE — 3331090001 HH PPS REVENUE CREDIT

## 2017-10-21 PROCEDURE — 3331090002 HH PPS REVENUE DEBIT

## 2017-10-22 PROCEDURE — 3331090002 HH PPS REVENUE DEBIT

## 2017-10-22 PROCEDURE — 3331090001 HH PPS REVENUE CREDIT

## 2017-10-23 PROCEDURE — 3331090001 HH PPS REVENUE CREDIT

## 2017-10-23 PROCEDURE — 3331090002 HH PPS REVENUE DEBIT

## 2017-10-24 ENCOUNTER — HOME CARE VISIT (OUTPATIENT)
Dept: SCHEDULING | Facility: HOME HEALTH | Age: 72
End: 2017-10-24
Payer: MEDICARE

## 2017-10-24 ENCOUNTER — HOME CARE VISIT (OUTPATIENT)
Dept: HOME HEALTH SERVICES | Facility: HOME HEALTH | Age: 72
End: 2017-10-24
Payer: MEDICARE

## 2017-10-24 PROCEDURE — G0151 HHCP-SERV OF PT,EA 15 MIN: HCPCS

## 2017-10-24 PROCEDURE — 3331090001 HH PPS REVENUE CREDIT

## 2017-10-24 PROCEDURE — 3331090002 HH PPS REVENUE DEBIT

## 2017-10-25 ENCOUNTER — HOME CARE VISIT (OUTPATIENT)
Dept: HOME HEALTH SERVICES | Facility: HOME HEALTH | Age: 72
End: 2017-10-25
Payer: MEDICARE

## 2017-10-25 PROCEDURE — 3331090002 HH PPS REVENUE DEBIT

## 2017-10-25 PROCEDURE — 3331090003 HH PPS REVENUE ADJ

## 2017-10-25 PROCEDURE — 3331090001 HH PPS REVENUE CREDIT

## 2018-11-01 ENCOUNTER — HOSPITAL ENCOUNTER (EMERGENCY)
Age: 73
Discharge: HOME | End: 2018-11-01
Attending: EMERGENCY MEDICINE
Payer: MEDICARE

## 2018-11-01 VITALS
DIASTOLIC BLOOD PRESSURE: 81 MMHG | WEIGHT: 169.09 LBS | HEART RATE: 77 BPM | OXYGEN SATURATION: 98 % | RESPIRATION RATE: 16 BRPM | BODY MASS INDEX: 28.17 KG/M2 | SYSTOLIC BLOOD PRESSURE: 146 MMHG | HEIGHT: 65 IN | TEMPERATURE: 98 F

## 2018-11-01 DIAGNOSIS — S09.90XA CLOSED HEAD INJURY, INITIAL ENCOUNTER: Primary | ICD-10-CM

## 2018-11-01 PROCEDURE — 99283 EMERGENCY DEPT VISIT LOW MDM: CPT

## 2018-11-01 NOTE — DISCHARGE INSTRUCTIONS
We hope that we have addressed all of your medical concerns. The examination and treatment you received in the Emergency Department were for an emergent problem and were not intended as complete care. It is important that you follow up with your healthcare provider(s) for ongoing care. If your symptoms worsen or do not improve as expected, and you are unable to reach your usual health care provider(s), you should return to the Emergency Department. Today's healthcare is undergoing tremendous change, and patient satisfaction surveys are one of the many tools to assess the quality of medical care. You may receive a survey from the [employer] regarding your experience in the Emergency Department. I hope that your experience has been completely positive, particularly the medical care that I provided. As such, please participate in the survey; anything less than excellent does not meet my expectations or intentions. [facility] and [address] [place] [address] participate in nationally recognized quality of care measures. If your blood pressure is greater than 120/80, as reported below, we urge that you seek medical care to address the potential of high blood pressure, commonly known as hypertension. Hypertension can be hereditary or can be caused by certain medical conditions, pain, stress, or \"white coat syndrome. \"       Please make an appointment with your health care provider(s) for follow up of your Emergency Department visit. VITALS:   Patient Vitals for the past 8 hrs:   Temp Pulse Resp BP SpO2   11/01/18 1456 98 °F (36.7 °C) 77 16 146/81 98 %          Thank you for allowing us to provide you with medical care today. We realize that you have many choices for your emergency care needs. Please choose us in the future for any continued health care needs. Damien Kelley, [facility].   Office: [phone]            No results found for this or any previous visit (from the past 24 hour(s)). No results found.
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
## 2019-09-28 PROCEDURE — 99285 EMERGENCY DEPT VISIT HI MDM: CPT

## 2019-09-28 PROCEDURE — 93005 ELECTROCARDIOGRAM TRACING: CPT

## 2019-09-29 ENCOUNTER — HOSPITAL ENCOUNTER (EMERGENCY)
Age: 74
Discharge: HOME | End: 2019-09-29
Attending: EMERGENCY MEDICINE
Payer: MEDICARE

## 2019-09-29 VITALS
RESPIRATION RATE: 15 BRPM | SYSTOLIC BLOOD PRESSURE: 148 MMHG | TEMPERATURE: 97.9 F | DIASTOLIC BLOOD PRESSURE: 65 MMHG | OXYGEN SATURATION: 99 % | HEART RATE: 55 BPM

## 2019-09-29 DIAGNOSIS — R07.9 CHEST PAIN, UNSPECIFIED TYPE: Primary | ICD-10-CM

## 2019-09-29 LAB
ALBUMIN SERPL-MCNC: 3.5 G/DL (ref 3.5–5)
ALBUMIN/GLOB SERPL: 0.9 {RATIO} (ref 1.1–2.2)
ALP SERPL-CCNC: 81 U/L (ref 45–117)
ALT SERPL-CCNC: 19 U/L (ref 12–78)
ANION GAP SERPL CALC-SCNC: 1 MMOL/L (ref 5–15)
AST SERPL-CCNC: 16 U/L (ref 15–37)
ATRIAL RATE: 56 BPM
ATRIAL RATE: 60 BPM
BASOPHILS # BLD: 0 K/UL (ref 0–0.1)
BASOPHILS NFR BLD: 0 % (ref 0–1)
BILIRUB SERPL-MCNC: 0.3 MG/DL (ref 0.2–1)
BUN SERPL-MCNC: 27 MG/DL (ref 6–20)
BUN/CREAT SERPL: 25 (ref 12–20)
CALCIUM SERPL-MCNC: 8.5 MG/DL (ref 8.5–10.1)
CALCULATED P AXIS, ECG09: 39 DEGREES
CALCULATED P AXIS, ECG09: 41 DEGREES
CALCULATED R AXIS, ECG10: 0 DEGREES
CALCULATED R AXIS, ECG10: 4 DEGREES
CALCULATED T AXIS, ECG11: 22 DEGREES
CALCULATED T AXIS, ECG11: 23 DEGREES
CHLORIDE SERPL-SCNC: 109 MMOL/L (ref 97–108)
CK SERPL-CCNC: 83 U/L (ref 26–192)
CO2 SERPL-SCNC: 29 MMOL/L (ref 21–32)
CREAT SERPL-MCNC: 1.08 MG/DL (ref 0.55–1.02)
DIAGNOSIS, 93000: NORMAL
DIAGNOSIS, 93000: NORMAL
DIFFERENTIAL METHOD BLD: NORMAL
EOSINOPHIL # BLD: 0.2 K/UL (ref 0–0.4)
EOSINOPHIL NFR BLD: 2 % (ref 0–7)
ERYTHROCYTE [DISTWIDTH] IN BLOOD BY AUTOMATED COUNT: 13 % (ref 11.5–14.5)
GLOBULIN SER CALC-MCNC: 3.8 G/DL (ref 2–4)
GLUCOSE SERPL-MCNC: 104 MG/DL (ref 65–100)
HCT VFR BLD AUTO: 40.3 % (ref 35–47)
HGB BLD-MCNC: 13.2 G/DL (ref 11.5–16)
IMM GRANULOCYTES # BLD AUTO: 0 K/UL (ref 0–0.04)
IMM GRANULOCYTES NFR BLD AUTO: 0 % (ref 0–0.5)
LYMPHOCYTES # BLD: 1.8 K/UL (ref 0.8–3.5)
LYMPHOCYTES NFR BLD: 24 % (ref 12–49)
MCH RBC QN AUTO: 31.1 PG (ref 26–34)
MCHC RBC AUTO-ENTMCNC: 32.8 G/DL (ref 30–36.5)
MCV RBC AUTO: 95 FL (ref 80–99)
MONOCYTES # BLD: 0.7 K/UL (ref 0–1)
MONOCYTES NFR BLD: 10 % (ref 5–13)
NEUTS SEG # BLD: 4.8 K/UL (ref 1.8–8)
NEUTS SEG NFR BLD: 64 % (ref 32–75)
NRBC # BLD: 0 K/UL (ref 0–0.01)
NRBC BLD-RTO: 0 PER 100 WBC
P-R INTERVAL, ECG05: 174 MS
P-R INTERVAL, ECG05: 186 MS
PLATELET # BLD AUTO: 186 K/UL (ref 150–400)
PMV BLD AUTO: 11.3 FL (ref 8.9–12.9)
POTASSIUM SERPL-SCNC: 4.5 MMOL/L (ref 3.5–5.1)
PROT SERPL-MCNC: 7.3 G/DL (ref 6.4–8.2)
Q-T INTERVAL, ECG07: 424 MS
Q-T INTERVAL, ECG07: 450 MS
QRS DURATION, ECG06: 84 MS
QRS DURATION, ECG06: 86 MS
QTC CALCULATION (BEZET), ECG08: 424 MS
QTC CALCULATION (BEZET), ECG08: 434 MS
RBC # BLD AUTO: 4.24 M/UL (ref 3.8–5.2)
SODIUM SERPL-SCNC: 139 MMOL/L (ref 136–145)
TROPONIN I BLD-MCNC: <0.04 NG/ML (ref 0–0.08)
TROPONIN I SERPL-MCNC: <0.05 NG/ML
VENTRICULAR RATE, ECG03: 56 BPM
VENTRICULAR RATE, ECG03: 60 BPM
WBC # BLD AUTO: 7.6 K/UL (ref 3.6–11)

## 2019-09-29 PROCEDURE — 36415 COLL VENOUS BLD VENIPUNCTURE: CPT

## 2019-09-29 PROCEDURE — 93005 ELECTROCARDIOGRAM TRACING: CPT

## 2019-09-29 PROCEDURE — 84484 ASSAY OF TROPONIN QUANT: CPT

## 2019-09-29 PROCEDURE — 80053 COMPREHEN METABOLIC PANEL: CPT

## 2019-09-29 PROCEDURE — 82550 ASSAY OF CK (CPK): CPT

## 2019-09-29 PROCEDURE — 85025 COMPLETE CBC W/AUTO DIFF WBC: CPT

## 2019-09-29 RX ORDER — FAMOTIDINE 20 MG/1
20 TABLET, FILM COATED ORAL
Qty: 20 TAB | Refills: 0 | Status: SHIPPED | OUTPATIENT
Start: 2019-09-29 | End: 2020-03-12 | Stop reason: ALTCHOICE

## 2019-09-29 NOTE — DISCHARGE INSTRUCTIONS

## 2019-09-29 NOTE — ED NOTES
Dr. Michael Hector reviewed discharge instructions with the patient. The patient verbalized understanding. All questions and concerns were addressed. The patient declined a wheelchair and is discharged ambulatory in the care of family members with instructions and prescriptions in hand. Pt is alert and oriented x 4. Respirations are clear and unlabored.

## 2019-09-29 NOTE — ED PROVIDER NOTES
EMERGENCY DEPARTMENT HISTORY AND PHYSICAL EXAM          Date: 9/29/2019  Patient Name: Zhang Bob    History of Presenting Illness     Chief Complaint   Patient presents with    Chest Pain     reports chest pain x15 minutes tonight. denies pain at this time. given aspirin pta. History Provided By: Patient and EMS    HPI: Zhang Bob is a 69 y. o.previously healthy female who presents via EMS to the ED c/o CP    Pt was checking tickets at a concert tonight around 2:48TZG when she developed a constant, sudden onset, pressure type CP radiating up right side into her ear and jaw. She went to the medic tent and by the time she sat it started to subside. At the tent they gave her \"5\" aspirins to chew and checked her EKG. She admit to nausea shortly after it started but that also resolved; she denies sweating or SOB. Patient specifically denies any recent fevers, chills, nausea, vomiting, diarrhea, CP, SOB, urinary sxs, changes in BM, or headache. She does note that otherwise while she has intermittent right upper abdominal pain the last for short period and then goes away spontaneously; she has not seen a physician for the symptoms. No similar episodes previously    PCP: Arley Black MD; this is patient's orthopedist, patient does not have a primary care physician. Allergies: NKDA  Social Hx: -tobacco, rare EtOH, -Illicit Drugs    There are no other complaints, changes, or physical findings at this time. Current Outpatient Medications   Medication Sig Dispense Refill    famotidine (PEPCID) 20 mg tablet Take 1 Tab by mouth nightly. 20 Tab 0    polyethylene glycol (MIRALAX) 17 gram packet Take 1 Packet by mouth daily as needed. 15 Packet 0    senna-docusate (SENNA PLUS) 8.6-50 mg per tablet Take 1 Tab by mouth two (2) times a day. 61 Tab 0       Past History     Past Medical History:  No past medical history on file.     Past Surgical History:  Past Surgical History:   Procedure Laterality Date    HX GYN       hysterctomy    HX ORTHOPAEDIC      foot surgery       Family History:  Family History   Problem Relation Age of Onset    Heart Attack Mother        Social History:  Social History     Tobacco Use    Smoking status: Never Smoker    Smokeless tobacco: Never Used   Substance Use Topics    Alcohol use: Yes     Comment: 1-2 glasses of wine 1-2 a week    Drug use: No       Allergies:  No Known Allergies      Review of Systems   Review of Systems   Constitutional: Negative for activity change, appetite change, chills, fever and unexpected weight change. HENT: Negative for congestion. Eyes: Negative for pain and visual disturbance. Respiratory: Negative for cough and shortness of breath. Cardiovascular: Positive for chest pain. Gastrointestinal: Positive for nausea. Negative for abdominal pain, diarrhea and vomiting. Genitourinary: Negative for dysuria. Musculoskeletal: Negative for back pain. Skin: Negative for rash. Neurological: Negative for headaches. Physical Exam   Physical Exam   Constitutional: She is oriented to person, place, and time. She appears well-developed and well-nourished. Well appearing elderly female currently in NAD   HENT:   Head: Normocephalic and atraumatic. Mouth/Throat: Oropharynx is clear and moist.   Eyes: Pupils are equal, round, and reactive to light. Conjunctivae and EOM are normal. Right eye exhibits no discharge. Left eye exhibits no discharge. Neck: Normal range of motion. Neck supple. Cardiovascular: Normal rate, regular rhythm and normal heart sounds. No murmur heard. Pulmonary/Chest: Effort normal and breath sounds normal. No respiratory distress. She has no wheezes. She has no rales. Abdominal: Soft. Bowel sounds are normal. She exhibits no distension. There is no tenderness. There is no rebound and no guarding. There is no reproducible right upper quadrant tenderness   Musculoskeletal: Normal range of motion.  She exhibits no edema. Neurological: She is alert and oriented to person, place, and time. No cranial nerve deficit. She exhibits normal muscle tone. Skin: Skin is warm and dry. No rash noted. She is not diaphoretic. Nursing note and vitals reviewed. Diagnostic Study Results     Labs -     Recent Results (from the past 12 hour(s))   EKG, 12 LEAD, INITIAL    Collection Time: 09/28/19 10:13 PM   Result Value Ref Range    Ventricular Rate 60 BPM    Atrial Rate 60 BPM    P-R Interval 174 ms    QRS Duration 84 ms    Q-T Interval 424 ms    QTC Calculation (Bezet) 424 ms    Calculated P Axis 39 degrees    Calculated R Axis 4 degrees    Calculated T Axis 22 degrees    Diagnosis       Normal sinus rhythm  Inferior infarct , age undetermined  Anterior infarct (cited on or before 30-SEP-2017)  When compared with ECG of 30-SEP-2017 12:43,  Questionable change in initial forces of Anterior leads     METABOLIC PANEL, COMPREHENSIVE    Collection Time: 09/29/19 12:53 AM   Result Value Ref Range    Sodium 139 136 - 145 mmol/L    Potassium 4.5 3.5 - 5.1 mmol/L    Chloride 109 (H) 97 - 108 mmol/L    CO2 29 21 - 32 mmol/L    Anion gap 1 (L) 5 - 15 mmol/L    Glucose 104 (H) 65 - 100 mg/dL    BUN 27 (H) 6 - 20 MG/DL    Creatinine 1.08 (H) 0.55 - 1.02 MG/DL    BUN/Creatinine ratio 25 (H) 12 - 20      GFR est AA >60 >60 ml/min/1.73m2    GFR est non-AA 50 (L) >60 ml/min/1.73m2    Calcium 8.5 8.5 - 10.1 MG/DL    Bilirubin, total 0.3 0.2 - 1.0 MG/DL    ALT (SGPT) 19 12 - 78 U/L    AST (SGOT) 16 15 - 37 U/L    Alk.  phosphatase 81 45 - 117 U/L    Protein, total 7.3 6.4 - 8.2 g/dL    Albumin 3.5 3.5 - 5.0 g/dL    Globulin 3.8 2.0 - 4.0 g/dL    A-G Ratio 0.9 (L) 1.1 - 2.2     CK W/ REFLX CKMB    Collection Time: 09/29/19 12:53 AM   Result Value Ref Range    CK 83 26 - 192 U/L   TROPONIN I    Collection Time: 09/29/19 12:53 AM   Result Value Ref Range    Troponin-I, Qt. <0.05 <0.05 ng/mL   CBC WITH AUTOMATED DIFF    Collection Time: 09/29/19 12:53 AM   Result Value Ref Range    WBC 7.6 3.6 - 11.0 K/uL    RBC 4.24 3.80 - 5.20 M/uL    HGB 13.2 11.5 - 16.0 g/dL    HCT 40.3 35.0 - 47.0 %    MCV 95.0 80.0 - 99.0 FL    MCH 31.1 26.0 - 34.0 PG    MCHC 32.8 30.0 - 36.5 g/dL    RDW 13.0 11.5 - 14.5 %    PLATELET 405 285 - 585 K/uL    MPV 11.3 8.9 - 12.9 FL    NRBC 0.0 0  WBC    ABSOLUTE NRBC 0.00 0.00 - 0.01 K/uL    NEUTROPHILS 64 32 - 75 %    LYMPHOCYTES 24 12 - 49 %    MONOCYTES 10 5 - 13 %    EOSINOPHILS 2 0 - 7 %    BASOPHILS 0 0 - 1 %    IMMATURE GRANULOCYTES 0 0.0 - 0.5 %    ABS. NEUTROPHILS 4.8 1.8 - 8.0 K/UL    ABS. LYMPHOCYTES 1.8 0.8 - 3.5 K/UL    ABS. MONOCYTES 0.7 0.0 - 1.0 K/UL    ABS. EOSINOPHILS 0.2 0.0 - 0.4 K/UL    ABS. BASOPHILS 0.0 0.0 - 0.1 K/UL    ABS. IMM. GRANS. 0.0 0.00 - 0.04 K/UL    DF AUTOMATED     EKG, 12 LEAD, SUBSEQUENT    Collection Time: 09/29/19  2:32 AM   Result Value Ref Range    Ventricular Rate 56 BPM    Atrial Rate 56 BPM    P-R Interval 186 ms    QRS Duration 86 ms    Q-T Interval 450 ms    QTC Calculation (Bezet) 434 ms    Calculated P Axis 41 degrees    Calculated R Axis 0 degrees    Calculated T Axis 23 degrees    Diagnosis       Sinus bradycardia  Inferior infarct , age undetermined  Anterior infarct , age undetermined  When compared with ECG of 28-SEP-2019 22:13,  MANUAL COMPARISON REQUIRED, DATA IS UNCONFIRMED     POC TROPONIN-I    Collection Time: 09/29/19  3:41 AM   Result Value Ref Range    Troponin-I (POC) <0.04 0.00 - 0.08 ng/mL       Radiologic Studies -   No orders to display     CT Results  (Last 48 hours)    None        CXR Results  (Last 48 hours)    None            Medical Decision Making   I am the first provider for this patient. I reviewed the vital signs, available nursing notes, past medical history, past surgical history, family history and social history. Vital Signs-Reviewed the patient's vital signs.   Patient Vitals for the past 12 hrs:   Temp Pulse Resp BP SpO2   09/29/19 0400  (!) 55 15 148/65 99 %   09/29/19 0345  (!) 59 16 160/60 100 %   09/29/19 0330  62 15 165/70 100 %   09/29/19 0315  (!) 56 18 147/70 98 %   09/29/19 0300  (!) 55 16 154/69 99 %   09/29/19 0245  (!) 54 15 151/63 100 %   09/29/19 0239  (!) 58 20 147/67 100 %   09/29/19 0215  (!) 58 17 142/70 99 %   09/29/19 0200  61 17 133/66 98 %   09/29/19 0145  62 16 136/69 99 %   09/29/19 0130  63 16 136/66 98 %   09/29/19 0115  60 16 138/64 99 %   09/29/19 0100  64 17 148/70 100 %   09/29/19 0023 97.9 °F (36.6 °C) 64 14 155/78    09/28/19 2154 98.1 °F (36.7 °C) 62 16 149/66 100 %       Pulse Oximetry Analysis - 100% on RA    Cardiac Monitor:   Rate: 68bpm  Rhythm: Normal Sinus Rhythm      Records Reviewed: Nursing Notes, Old Medical Records, Previous Radiology Studies and Previous Laboratory Studies    Provider Notes (Medical Decision Making):   MDM: Elderly female with zero cardiac risk factors presenting with one episode of CP 4 hours ago. Patient notes it was very hot and she was sweating when her symptoms started and is wondering if this contributed to her pain. She has been asymptomatic since that time as the pain only lasted a short period. Patient also notes some intermittent right upper quadrant pain off and on. Do not feel that it is imperative that she receives a ultrasound tonight as her labs are not concerning, but I do recommend she seek a primary care physician who can send her to cardiology for stress testing, obtained cholesterol levels, and send her for outpatient ultrasound of the right upper quadrant. ED Course:   Initial assessment performed. The patients presenting problems have been discussed, and they are in agreement with the care plan formulated and outlined with them. I have encouraged them to ask questions as they arise throughout their visit.     EKG interpretation: (Preliminary)  Rhythm: Normal sinus rhythm with a regular rate at 60 bpm; mild left axis deviation; normal [place] interval; normal QRS; there is T wave inversion in lead III unchanged from September 2017, with a new mild delay in R wave progression. EKG interpretation: (Preliminary)  Rhythm: Normal sinus rhythm with a regular rate at 56 bpm; mild left axis deviation; normal HI interval; normal QRS; there are no ST-T changes compared to prior. PROGRESS NOTE:  2:20 AM  Pt sitting comfortable in bed. Vital signs remained stable and we have discussed her lab results. Given her mild CLIFFORD with being out in the heat today after her IV fluid infusion versus drinking water and she states she would like to drink the water as they have been having trouble with her IV. Recommend repeat of a POC troponin to make sure it has not changed and if it remains stable, we will get her discharged home. 3 AM  Continue to await POC troponin; requesting draw from ED tech. 3:45 AM  Continue to await POC troponin; discussed this with the patient's nurse. 4:15 AM  Repeat troponin remains negative. Recommend patient to follow-up with cardiology for stress testing and the [facility] phone number was provided. Again stressed the importance of having a primary care physician for patient. Discharge note:  4:15 AM  Pt re-evaluated and noted to be feeling better, ready for discharge. Updated pt on all final lab findings. Will follow up as instructed. All questions have been answered, pt voiced understanding and agreement with plan. Specific return precautions provided as well as instructions to return to the ED should sx worsen at any time. Vital signs stable for discharge. Critical Care Time:   0      Diagnosis     Clinical Impression:   1. Chest pain, unspecified type        PLAN:  1. Discharge Medication List as of 9/29/2019  3:29 AM        2.    Follow-up Information     Follow up With Specialties Details Why Contact Info    Primary care   Please find a PCP to discuss a yearly physical, cholesterol check, stress test and abdominal ultrasound     [facility] EMERGENCY DEPT Emergency Medicine  If symptoms worsen  [address] [place] [address]  [phone]        Return to ED if worse     Disposition:  home      Please note, this dictation was completed with [employer], the computer voice recognition software. Quite often unanticipated grammatical, syntax, homophones, and other interpretive errors are inadvertently transcribed by the computer software. Please disregard these errors. Please excuse any errors that have escaped final proof reading.

## 2019-10-08 ENCOUNTER — OFFICE VISIT (OUTPATIENT)
Dept: SURGERY | Age: 74
End: 2019-10-08

## 2019-10-08 VITALS
DIASTOLIC BLOOD PRESSURE: 83 MMHG | SYSTOLIC BLOOD PRESSURE: 148 MMHG | OXYGEN SATURATION: 96 % | WEIGHT: 168.9 LBS | HEIGHT: 65 IN | HEART RATE: 67 BPM | BODY MASS INDEX: 28.14 KG/M2 | TEMPERATURE: 98.1 F

## 2019-10-08 DIAGNOSIS — R10.9 RIGHT FLANK PAIN: Primary | ICD-10-CM

## 2019-10-08 RX ORDER — IBUPROFEN 800 MG/1
800 TABLET ORAL
Qty: 42 TAB | Refills: 0 | Status: SHIPPED | OUTPATIENT
Start: 2019-10-08 | End: 2019-10-22

## 2019-10-08 NOTE — PROGRESS NOTES
Chief Complaint   Patient presents with    Abdominal Pain     SEEN IN ER / EVAL OF VERNON     1. Have you been to the ER, urgent care clinic since your last visit? Hospitalized since your last visit? YES, 9/29/19    2. Have you seen or consulted any other health care providers outside of the [address] [place] [address] since your last visit? Include any pap smears or colon screening.  NO

## 2019-10-10 ENCOUNTER — HOSPITAL ENCOUNTER (OUTPATIENT)
Dept: ULTRASOUND IMAGING | Age: 74
Discharge: HOME | End: 2019-10-10
Attending: SURGERY
Payer: MEDICARE

## 2019-10-10 DIAGNOSIS — R10.9 RIGHT FLANK PAIN: ICD-10-CM

## 2019-10-10 PROCEDURE — 76700 US EXAM ABDOM COMPLETE: CPT

## 2019-10-23 ENCOUNTER — TELEPHONE (OUTPATIENT)
Dept: SURGERY | Age: 74
End: 2019-10-23

## 2019-10-24 DIAGNOSIS — R10.9 RIGHT SIDED ABDOMINAL PAIN: Primary | ICD-10-CM

## 2019-10-24 NOTE — PROGRESS NOTES
Encounter Date: 10/8/2019  History and Physical    Assessment:   Chief complaint: Right flank pain. Differential diagnosis: Costochondritis vs biliary colic vs scar tissue from old ruptured appendix. 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 Has no PCP. Body mass index is 28.11 kg/m². Plan/Recommendations/Medical Decision Making: Will send for US. Discussed that she needs to get a PCP. Offered our office's help. Seeing Dr. Tati Chadwick later this month. Will call her with US results. HPI:   Patient is a 69 y.o. female who is seen after ER visit for SS CP 9/29/19. She was at work and the pain came on. Radiated to her RUQ and right ear. In ER, ruled out acute MI. All labs WNL. Now, she is still having right flank pain. Every day. Steady but worse with standing a long time. 5 years ago, h/o ruptured appendix managed non-operatively. History reviewed. No pertinent past medical history. Past Surgical History:   Procedure Laterality Date    HX GYN       hysterctomy    HX ORTHOPAEDIC      foot surgery    HX ORTHOPAEDIC  10/2017    RT. FRACTRURED HIP     Social History     Tobacco Use    Smoking status: Never Smoker    Smokeless tobacco: Never Used   Substance Use Topics    Alcohol use: Yes     Comment: 1-2 glasses of wine 1-2 a week      Family History   Problem Relation Age of Onset    Heart Attack Mother        Current Outpatient Medications   Medication Sig    famotidine (PEPCID) 20 mg tablet Take 1 Tab by mouth nightly.  polyethylene glycol (MIRALAX) 17 gram packet Take 1 Packet by mouth daily as needed.  senna-docusate (SENNA PLUS) 8.6-50 mg per tablet Take 1 Tab by mouth two (2) times a day. No current facility-administered medications for this visit. Allergies:  No Known Allergies     Review of Systems:  10 systems reviewed. See scanned sheet in \"Media\" section. See HPI for pertinent positives and negatives.       Objective:     Visit Vitals  /83 (BP 1 Location: Right arm, BP Patient Position: Sitting)   Pulse 67   Temp 98.1 °F (36.7 °C) (Oral)   Ht 5' 5\" (1.651 m)   Wt 76.6 kg (168 lb 14.4 oz)   SpO2 96%   BMI 28.11 kg/m²     General appearance  Alert, cooperative, no distress, appears stated age   HEENT  Anicteric   Neck Supple   Back   No CVA tenderness   Lungs   CTAB   Heart  Regular rate and rhythm. Abdomen   Soft. Bowel sounds normal. No palpable masses. Presently non-tender. Extremities No CCE.    Pulses 2+ right radial   Skin Skin color, texture, turgor normal.        Neurologic Without overt sensory or motor deficit     Imaging and Lab Review:   images and reports reviewed    Signed By: Collins Wynn MD     October 24, 2019

## 2019-10-24 NOTE — TELEPHONE ENCOUNTER
She called back. Discussed US results - sludge but no inflammatory changes -- and her sxs. Does not sound like biliary colic. H/o ruptured appendix without surgery. Will send for CT.

## 2019-10-25 ENCOUNTER — OFFICE VISIT (OUTPATIENT)
Dept: CARDIOLOGY CLINIC | Age: 74
End: 2019-10-25

## 2019-10-25 VITALS
SYSTOLIC BLOOD PRESSURE: 142 MMHG | HEIGHT: 65 IN | BODY MASS INDEX: 28.36 KG/M2 | RESPIRATION RATE: 18 BRPM | OXYGEN SATURATION: 98 % | DIASTOLIC BLOOD PRESSURE: 84 MMHG | HEART RATE: 78 BPM | WEIGHT: 170.2 LBS

## 2019-10-25 DIAGNOSIS — R07.9 CHEST PAIN, UNSPECIFIED TYPE: Primary | ICD-10-CM

## 2019-10-25 DIAGNOSIS — R06.02 SOBOE (SHORTNESS OF BREATH ON EXERTION): ICD-10-CM

## 2019-10-25 RX ORDER — IBUPROFEN 400 MG/1
TABLET ORAL
COMMUNITY
End: 2020-03-12 | Stop reason: ALTCHOICE

## 2019-10-25 NOTE — PROGRESS NOTES
[address] [place] [address], [address] [facility]  [phone]     Subjective:      Pj Loja is a 69 y.o. female with no significant past medical history is here to establish care, after ED presentation 9/26/19. Per patient, she was working at the [employer]. Developed sudden onset midsternal chest pain radiated to her neck with associated sob, lasted 30 minutes. Was given 5 baby ASA by the EMT then transferred to ED [facility] ED where cardiac work up  Was negative. AT [place] she was advised to f/u with cardiology and general surgery to rule out gallbladder issue. Today she denies any further cp, does report RUQ pain---follows with bronwyn. She has been taking Ibuprofren 500 mg TID. She reports occasional mild sanford. Denies hx smoking, substance abuse. She drinks occasionally. Denies family hx CAD but no idea re her father. NO PCP ---      The patient denies orthopnea, PND, LE edema, palpitations, syncope, or presyncope. Patient Active Problem List    Diagnosis Date Noted    Displaced subtrochanteric fracture of right femur, initial encounter for closed fracture ([facility] [address] 09/30/2017    Elevated BP without diagnosis of hypertension 09/30/2017      None  History reviewed. No pertinent past medical history. Past Surgical History:   Procedure Laterality Date    HX GYN       hysterctomy    HX ORTHOPAEDIC      foot surgery    HX ORTHOPAEDIC  10/2017    RT.  FRACTRURED HIP     No Known Allergies   Family History   Problem Relation Age of Onset    No Known Problems Mother     No Known Problems Father       Social History     Socioeconomic History    Marital status: SINGLE     Spouse name: Not on file    Number of children: Not on file    Years of education: Not on file    Highest education level: Not on file   Occupational History    Not on file   Social Needs    Financial resource strain: Not on file    Food insecurity:     Worry: Not on file Inability: Not on file    Transportation needs:     Medical: Not on file     Non-medical: Not on file   Tobacco Use    Smoking status: Never Smoker    Smokeless tobacco: Never Used   Substance and Sexual Activity    Alcohol use: Yes     Comment: 1-2 glasses of wine 1-2 a week    Drug use: No    Sexual activity: Not on file   Lifestyle    Physical activity:     Days per week: Not on file     Minutes per session: Not on file    Stress: Not on file   Relationships    Social connections:     Talks on phone: Not on file     Gets together: Not on file     Attends Gnosticism service: Not on file     Active member of club or organization: Not on file     Attends meetings of clubs or organizations: Not on file     Relationship status: Not on file    Intimate partner violence:     Fear of current or ex partner: Not on file     Emotionally abused: Not on file     Physically abused: Not on file     Forced sexual activity: Not on file   Other Topics Concern    Not on file   Social History Narrative    Not on file      Current Outpatient Medications   Medication Sig    ibuprofen (MOTRIN) 400 mg tablet Take  by mouth every six (6) hours as needed for Pain.  famotidine (PEPCID) 20 mg tablet Take 1 Tab by mouth nightly.  polyethylene glycol (MIRALAX) 17 gram packet Take 1 Packet by mouth daily as needed.  senna-docusate (SENNA PLUS) 8.6-50 mg per tablet Take 1 Tab by mouth two (2) times a day. No current facility-administered medications for this visit. Review of Symptoms:  11 systems reviewed, negative other than as stated in the HPI    Physical ExamPhysical Exam:    Vitals:    10/25/19 0939 10/25/19 0953 10/25/19 1010   BP: (!) 160/92 (!) 150/100 142/84   Pulse: 78     Resp: 18     SpO2: 98%     Weight: 170 lb 3.2 oz (77.2 kg)     Height: 5' 5\" (1.651 m)       Body mass index is 28.32 kg/m². General PE  Gen:  NAD  Mental Status - Alert. General Appearance - Not in acute distress.    HEENT:  PERRL, no carotid bruits or JVD  Chest and Lung Exam   Inspection: Accessory muscles - No use of accessory muscles in breathing. Auscultation:   Breath sounds: - Normal.   Cardiovascular   Inspection: Jugular vein - Bilateral - Inspection Normal.   Palpation/Percussion:   Apical Impulse: - Normal.   Auscultation: Rhythm - Regular. Heart Sounds - S1 WNL and S2 WNL. No S3 or S4. Murmurs & Other Heart Sounds: Auscultation of the heart reveals - No Murmurs. Peripheral Vascular   Upper Extremity: Inspection - Bilateral - No Cyanotic nailbeds or Digital clubbing. Lower Extremity:   Palpation: Edema - Bilateral - No edema. Abdomen:   Soft, non-tender, bowel sounds are active. Neuro: A&O times 3, CN and motor grossly WNL    Labs:   No results found for: CHOL, CHOLX, CHLST, CHOLV, 616678, HDL, HDLP, LDL, LDLC, DLDLP, TGLX, TRIGL, TRIGP, CHHD, CHHDX  No results found for: CPK, CPKX, CPX  Lab Results   Component Value Date/Time    Sodium 139 09/29/2019 12:53 AM    Potassium 4.5 09/29/2019 12:53 AM    Chloride 109 (H) 09/29/2019 12:53 AM    CO2 29 09/29/2019 12:53 AM    Anion gap 1 (L) 09/29/2019 12:53 AM    Glucose 104 (H) 09/29/2019 12:53 AM    BUN 27 (H) 09/29/2019 12:53 AM    Creatinine 1.08 (H) 09/29/2019 12:53 AM    BUN/Creatinine ratio 25 (H) 09/29/2019 12:53 AM    GFR est AA >60 09/29/2019 12:53 AM    GFR est non-AA 50 (L) 09/29/2019 12:53 AM    Calcium 8.5 09/29/2019 12:53 AM    Bilirubin, total 0.3 09/29/2019 12:53 AM    AST (SGOT) 16 09/29/2019 12:53 AM    Alk. phosphatase 81 09/29/2019 12:53 AM    Protein, total 7.3 09/29/2019 12:53 AM    Albumin 3.5 09/29/2019 12:53 AM    Globulin 3.8 09/29/2019 12:53 AM    A-G Ratio 0.9 (L) 09/29/2019 12:53 AM    ALT (SGPT) 19 09/29/2019 12:53 AM       EKG:  NSR     Assessment:     Assessment:      1. Chest pain, unspecified type    2.  SOBOE (shortness of breath on exertion)        Orders Placed This Encounter    AMB POC EKG ROUTINE W/ 12 LEADS, INTER & REP     Order Specific Question:   Reason for Exam:     Answer:   routine    ibuprofen (MOTRIN) 400 mg tablet     Sig: Take  by mouth every six (6) hours as needed for Pain. Plan: This  is a 69 y.o. female with no significant past medical history is here to establish care, after ED presentation 9/26/19. Per patient, she was working at the [employer]. Developed sudden onset midsternal chest pain radiated to her neck with associated sob, lasted 30 minutes. Was given 5 baby ASA by the EMT then transferred to ED [facility] ED where cardiac work up  Was negative. At dc she was advised to f/u with cardiology and general surgery to rule out gallbladder issue. Today she denies any further cp, does report RUQ pain---follows with bronwyn. She has been taking Ibuprofren 500 mg TID. She reports occasional mild sanford. Denies hx smoking, substance abuse. She drinks occasionally. Denies family hx CAD but no idea re her father. NO PCP ---     Atypical cp, sanford  Obtain stress echo    BP slightly elevated likely s/t pain / NSAID use    RUQ  Followed by dr Berlin Valdez results - sludge but no inflammatory changes -- and her sxs. Does not sound like biliary colic. H/o ruptured appendix without surgery.  Await abdominal CT      Continue current care and f/u when testing complete       Levar Prado MD

## 2019-10-25 NOTE — TELEPHONE ENCOUNTER
Patient called to ask if the ct scan that Dr VARNER told her about has been ordered. Please call back today to confirm.

## 2019-10-25 NOTE — PROGRESS NOTES
1. Have you been to the ER, urgent care clinic since your last visit? Hospitalized since your last visit? Seen on 9/29/19 for chest pain. 2. Have you seen or consulted any other health care providers outside of the [address] [place] [address] since your last visit? Include any pap smears or colon screening.    No.      Chief Complaint   Patient presents with    New Patient     soboe    ED Follow-up     for chest pain

## 2019-10-25 NOTE — TELEPHONE ENCOUNTER
CT scheduled for 11/3/19. Patient instructed to  barium prior and start drinking 2 hours before arriving. Express understanding.

## 2019-11-03 ENCOUNTER — HOSPITAL ENCOUNTER (OUTPATIENT)
Dept: CT IMAGING | Age: 74
Discharge: HOME | End: 2019-11-03
Attending: SURGERY
Payer: MEDICARE

## 2019-11-03 DIAGNOSIS — R10.9 RIGHT SIDED ABDOMINAL PAIN: ICD-10-CM

## 2019-11-03 PROCEDURE — 74177 CT ABD & PELVIS W/CONTRAST: CPT

## 2019-11-03 PROCEDURE — 74011000255 HC RX REV CODE- 255: Performed by: SURGERY

## 2019-11-03 PROCEDURE — 74011636320 HC RX REV CODE- 636/320: Performed by: SURGERY

## 2019-11-03 RX ORDER — BARIUM SULFATE 20 MG/ML
900 SUSPENSION ORAL
Status: COMPLETED | OUTPATIENT
Start: 2019-11-03 | End: 2019-11-03

## 2019-11-03 RX ORDER — SODIUM CHLORIDE 0.9 % (FLUSH) 0.9 %
10 SYRINGE (ML) INJECTION
Status: COMPLETED | OUTPATIENT
Start: 2019-11-03 | End: 2019-11-03

## 2019-11-03 RX ADMIN — BARIUM SULFATE 900 ML: 21 SUSPENSION ORAL at 09:04

## 2019-11-03 RX ADMIN — Medication 10 ML: at 09:04

## 2019-11-03 RX ADMIN — IOPAMIDOL 100 ML: 755 INJECTION, SOLUTION INTRAVENOUS at 09:04

## 2019-11-08 ENCOUNTER — DOCUMENTATION ONLY (OUTPATIENT)
Dept: SURGERY | Age: 74
End: 2019-11-08

## 2019-11-08 ENCOUNTER — TELEPHONE (OUTPATIENT)
Dept: SURGERY | Age: 74
End: 2019-11-08

## 2019-11-08 NOTE — PROGRESS NOTES
Called Ms. Tillman to go over CT result. Read as Lori Vasquez acute process. 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 I suggested she see GI for consideration of EGD and colonoscopy. She says it has been a number of years since her last colonoscopy. Offered to get her an appt. She deferred for now. I also recommended she get a PCP. Gave her the number for [facility].

## 2020-03-12 ENCOUNTER — OFFICE VISIT (OUTPATIENT)
Dept: INTERNAL MEDICINE CLINIC | Age: 75
End: 2020-03-12

## 2020-03-12 VITALS
SYSTOLIC BLOOD PRESSURE: 164 MMHG | TEMPERATURE: 97.8 F | RESPIRATION RATE: 18 BRPM | HEART RATE: 70 BPM | BODY MASS INDEX: 28.16 KG/M2 | OXYGEN SATURATION: 99 % | WEIGHT: 169 LBS | HEIGHT: 65 IN | DIASTOLIC BLOOD PRESSURE: 75 MMHG

## 2020-03-12 DIAGNOSIS — Z13.220 SCREENING FOR CHOLESTEROL LEVEL: ICD-10-CM

## 2020-03-12 DIAGNOSIS — E66.3 OVERWEIGHT: ICD-10-CM

## 2020-03-12 DIAGNOSIS — Z12.11 SCREENING FOR COLON CANCER: Primary | ICD-10-CM

## 2020-03-12 DIAGNOSIS — R10.11 RIGHT UPPER QUADRANT ABDOMINAL PAIN: ICD-10-CM

## 2020-03-12 NOTE — PROGRESS NOTES
Reviewed record in preparation for visit and have obtained necessary documentation. Identified pt with two pt identifiers(name and ). Chief Complaint   Patient presents with   [address]    Abdominal Pain       Health Maintenance Due   Topic Date Due    Hepatitis C Test  1945    DTaP/Tdap/Td  (1 - Tdap) 1967    Cholesterol Test   1986    Shingles Vaccine (1 of 2) 1996    Mammogram  1996    Colon Cancer Stool Test  1996    Glaucoma Screening   2011    Bone Mineral Density   2011    Pneumococcal Vaccine (1 of 1 - PPSV23) 2011    Annual Well Visit  2018       Ms. Stevenson Shields has a reminder for a \"due or due soon\" health maintenance. I have asked that she discuss this further with her primary care provider for follow-up on this health maintenance. Coordination of Care Questionnaire:  :     1) Have you been to an emergency room, urgent care clinic since your last visit? no   Hospitalized since your last visit? no             2) Have you seen or consulted any other health care providers outside of [address] [place] [address] since your last visit? no  (Include any pap smears or colon screenings in this section.)    3) In the event something were to happen to you and you were unable to speak on your behalf, do you have an Advance Directive/ Living Will in place stating your wishes? NO    Do you have an Advance Directive on file? no    4) Are you interested in receiving information on Advance Directives? NO    Patient is accompanied by self I have received verbal consent from Jermaine Garces to discuss any/all medical information while they are present in the room.

## 2020-03-12 NOTE — PATIENT INSTRUCTIONS
Office Policies Phone calls/patient messages: Please allow up to 24 hours for someone in the office to contact you about your call or message. Be mindful your provider may be out of the office or your message may require further review. We encourage you to use [facility] for your messages as this is a faster, more efficient way to communicate with our office Medication Refills: 
         
Prescription medications require 48-72 business hours to process. We encourage you to use [facility] for your refills. For controlled medications: Please allow 72 business hours to process. Certain medications may require you to  a written prescription at our office. NO narcotic/controlled medications will be prescribed after 4pm Monday through Friday or on weekends Form/Paperwork Completion: 
         
Please note a $25 fee may incur for all paperwork for completed by our providers. We ask that you allow 7-10 business days. Pre-payment is due prior to picking up/faxing the completed form. You may also download your forms to [facility] to have your doctor print off.

## 2020-03-12 NOTE — PROGRESS NOTES
Ms. Hudson Hooper is a new patient who is here to establish care. CC:  Establish Care and Abdominal Pain       HPI:    Presenting to establish care  She has had mild intermittent R  Mid quadrant pain that at times radiated to her back, can go days without pain and at times has pain. She was seen By Dr Alexey Cardona   On his reviewed there was ascending colon diverticulum that may be very mildly inflamed. 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       Dr Alexey Cardona suggested  GI for consideration of EGD and colonoscopy. She states she will not do a colonoscopy on endoscopy \" scared of perforation\"   Had colonoscopy  10 + years ago. Rare use of ibuprofen   She denies weight loss and fevers   Denies changes in BMs    She has seen Dr Shireen Childers and BP was elevated. Patient then had negative ECHO stress. Monitors at home and home BP is normal     Declines vaccines, mammograms    Review of systems:  Constitutional: negative for fever, chills, weight loss, night sweats   Eyes : negative for vision changes, eye pain and discharge  Nose and Throat: negative for tinnitus, sore throat   Cardiovascular: negative for chest pain, palpitations and shortness of breath  Respiratory: negative for shortness of breath, cough and wheezing   Gastroinstestinal: see HPI, she denies  nausea, vomiting, diarrhea, constipation, and blood in the stool  Musculoskeletal: negative for back ache and joint ache   Genitourinary: negative for dysuria, nocturia, polyuria and hematuria   Neurologic: Negative for focal weakness, numbness or incoordination  Skin: negative for rash, pruritus  Hematologic: negative for easy bruising      History reviewed. No pertinent past medical history. Past Surgical History:   Procedure Laterality Date    HX GYN       hysterctomy    HX ORTHOPAEDIC      foot surgery    HX ORTHOPAEDIC  10/2017    RT.  FRACTRURED HIP       No Known Allergies    No current outpatient medications on file prior to visit. No current facility-administered medications on file prior to visit. family history includes No Known Problems in her father and mother. Social History     Socioeconomic History    Marital status: SINGLE     Spouse name: Not on file    Number of children: Not on file    Years of education: Not on file    Highest education level: Not on file   Occupational History    Not on file   Social Needs    Financial resource strain: Not on file    Food insecurity     Worry: Not on file     Inability: Not on file    Transportation needs     Medical: Not on file     Non-medical: Not on file   Tobacco Use    Smoking status: Never Smoker    Smokeless tobacco: Never Used   Substance and Sexual Activity    Alcohol use: Not Currently     Comment: 1-2 glasses of wine 1-2 a week    Drug use: No    Sexual activity: Not on file   Lifestyle    Physical activity     Days per week: Not on file     Minutes per session: Not on file    Stress: Not on file   Relationships    Social connections     Talks on phone: Not on file     Gets together: Not on file     Attends Sikhism service: Not on file     Active member of club or organization: Not on file     Attends meetings of clubs or organizations: Not on file     Relationship status: Not on file    Intimate partner violence     Fear of current or ex partner: Not on file     Emotionally abused: Not on file     Physically abused: Not on file     Forced sexual activity: Not on file   Other Topics Concern    Not on file   Social History Narrative    Not on file       Visit Vitals  /75 (BP 1 Location: Right arm, BP Patient Position: Sitting)   Pulse 70   Temp 97.8 °F (36.6 °C) (Oral)   Resp 18   Ht 5' 5\" (1.651 m)   Wt 169 lb (76.7 kg)   SpO2 99%   BMI 28.12 kg/m²     General:  Well appearing female no acute distress  HEENT:   PERRL,normal conjunctiva. External ear and canals normal, TMs normal.  Hearing normal to voice.   Nose without edema or discharge, normal septum. Lips, teeth, gums normal.  Oropharynx: no erythema, no exudates, no lesions, normal tongue. Neck:  Supple. Thyroid normal size, nontender, without nodules. No carotid bruit. No masses or lymphadenopathy  Respiratory: no respiratory distress,  no wheezing, no rhonchi, no rales. No chest wall tenderness. Cardiovascular:  RRR, normal S1S2, no murmur. Gastrointestinal: normal bowel sounds, soft, nontender, without masses. No hepatosplenomegaly. Extremities +2 pulses, no edema, normal sensation   Musculoskeletal:  Normal gait. Normal digits and nails. Normal strength and tone, no atrophy, and no abnormal movement. Skin:  No rash, no lesions, no ulcers. Skin warm, normal turgor, without induration or nodules. Neuro:  A and OX4, fluent speech, cranial nerves normal 2-12. Sensation normal to light touch.   DTR symmetrical  Psych:  Normal affect      Lab Results   Component Value Date/Time    WBC 7.6 09/29/2019 12:53 AM    HGB 13.2 09/29/2019 12:53 AM    HCT 40.3 09/29/2019 12:53 AM    PLATELET 682 78/86/2648 12:53 AM    MCV 95.0 09/29/2019 12:53 AM     Lab Results   Component Value Date/Time    Sodium 139 09/29/2019 12:53 AM    Potassium 4.5 09/29/2019 12:53 AM    Chloride 109 (H) 09/29/2019 12:53 AM    CO2 29 09/29/2019 12:53 AM    Anion gap 1 (L) 09/29/2019 12:53 AM    Glucose 104 (H) 09/29/2019 12:53 AM    BUN 27 (H) 09/29/2019 12:53 AM    Creatinine 1.08 (H) 09/29/2019 12:53 AM    BUN/Creatinine ratio 25 (H) 09/29/2019 12:53 AM    GFR est AA >60 09/29/2019 12:53 AM    GFR est non-AA 50 (L) 09/29/2019 12:53 AM    Calcium 8.5 09/29/2019 12:53 AM     No results found for: CHOL, CHOLPOCT, CHOLX, CHLST, CHOLV, HDL, HDLPOC, HDLP, LDL, LDLCPOC, LDLC, DLDLP, VLDLC, VLDL, TGLX, TRIGL, TRIGP, TGLPOCT, CHHD, CHHDX  Lab Results   Component Value Date/Time    TSH 3.22 10/01/2017 04:10 AM     Lab Results   Component Value Date/Time    Hemoglobin A1c 5.2 10/01/2017 04:10 AM     Lab Results Component Value Date/Time    Vitamin D 25-Hydroxy 19.5 (L) 10/04/2017 05:05 AM                   Assessment and Plan:     1. Screening for colon cancer - patient declines colonoscopy therefore recommend colo guard, discussed that if positive she will agree to colonoscopy - she is to call her insurance first to ensure it is covered  - COLOGUARD TEST (FECAL DNA COLORECTAL CANCER SCREENING)    2. Screening for cholesterol level  - LIPID PANEL    3. Overweight: counseled on low sugar diet and low carb diet    4. Right upper quadrant abdominal pain- has had imaging done and has diverticulum with inflammation on ascending colon which would correspond to her area of discomfort, she declines colonoscopy due to fear of rupture ( a fried that rupture) I discussed the possibility of underlying malignancy and patient acknowledges understanding  - METABOLIC PANEL, COMPREHENSIVE  - CBC WITH AUTOMATED DIFF      Follow-up and Dispositions    · Return in about 6 months (around 9/12/2020) for Abdominal pain .           Arely Vaca MD

## 2020-03-19 LAB
ALBUMIN SERPL-MCNC: 4.5 G/DL (ref 3.7–4.7)
ALBUMIN/GLOB SERPL: 1.7 {RATIO} (ref 1.2–2.2)
ALP SERPL-CCNC: 90 IU/L (ref 39–117)
ALT SERPL-CCNC: 13 IU/L (ref 0–32)
AST SERPL-CCNC: 18 IU/L (ref 0–40)
BASOPHILS # BLD AUTO: 0 X10E3/UL (ref 0–0.2)
BASOPHILS NFR BLD AUTO: 1 %
BILIRUB SERPL-MCNC: 0.4 MG/DL (ref 0–1.2)
BUN SERPL-MCNC: 19 MG/DL (ref 8–27)
BUN/CREAT SERPL: 21 (ref 12–28)
CALCIUM SERPL-MCNC: 9.1 MG/DL (ref 8.7–10.3)
CHLORIDE SERPL-SCNC: 104 MMOL/L (ref 96–106)
CHOLEST SERPL-MCNC: 202 MG/DL (ref 100–199)
CO2 SERPL-SCNC: 23 MMOL/L (ref 20–29)
CREAT SERPL-MCNC: 0.92 MG/DL (ref 0.57–1)
EOSINOPHIL # BLD AUTO: 0.2 X10E3/UL (ref 0–0.4)
EOSINOPHIL NFR BLD AUTO: 4 %
ERYTHROCYTE [DISTWIDTH] IN BLOOD BY AUTOMATED COUNT: 12.1 % (ref 11.7–15.4)
GLOBULIN SER CALC-MCNC: 2.7 G/DL (ref 1.5–4.5)
GLUCOSE SERPL-MCNC: 97 MG/DL (ref 65–99)
HCT VFR BLD AUTO: 42.5 % (ref 34–46.6)
HDLC SERPL-MCNC: 60 MG/DL
HGB BLD-MCNC: 14.6 G/DL (ref 11.1–15.9)
IMM GRANULOCYTES # BLD AUTO: 0 X10E3/UL (ref 0–0.1)
IMM GRANULOCYTES NFR BLD AUTO: 0 %
LDLC SERPL CALC-MCNC: 130 MG/DL (ref 0–99)
LYMPHOCYTES # BLD AUTO: 2.3 X10E3/UL (ref 0.7–3.1)
LYMPHOCYTES NFR BLD AUTO: 39 %
MCH RBC QN AUTO: 31.1 PG (ref 26.6–33)
MCHC RBC AUTO-ENTMCNC: 34.4 G/DL (ref 31.5–35.7)
MCV RBC AUTO: 91 FL (ref 79–97)
MONOCYTES # BLD AUTO: 0.7 X10E3/UL (ref 0.1–0.9)
MONOCYTES NFR BLD AUTO: 12 %
NEUTROPHILS # BLD AUTO: 2.6 X10E3/UL (ref 1.4–7)
NEUTROPHILS NFR BLD AUTO: 44 %
PLATELET # BLD AUTO: 216 X10E3/UL (ref 150–450)
POTASSIUM SERPL-SCNC: 4.9 MMOL/L (ref 3.5–5.2)
PROT SERPL-MCNC: 7.2 G/DL (ref 6–8.5)
RBC # BLD AUTO: 4.69 X10E6/UL (ref 3.77–5.28)
SODIUM SERPL-SCNC: 142 MMOL/L (ref 134–144)
TRIGL SERPL-MCNC: 59 MG/DL (ref 0–149)
VLDLC SERPL CALC-MCNC: 12 MG/DL (ref 5–40)
WBC # BLD AUTO: 5.9 X10E3/UL (ref 3.4–10.8)

## 2020-03-30 ENCOUNTER — TELEPHONE (OUTPATIENT)
Dept: INTERNAL MEDICINE CLINIC | Age: 75
End: 2020-03-30

## 2020-03-30 NOTE — PROGRESS NOTES
Normal kidney and liver function   Normal blood count  Cholesterol: Triglycerides are normal  ( short term fat storage), HDL good cholesterol is at goal,  LDL which is bad cholesterol is mildly elevated. Recommend increasing  exercise to 30 minutes daily and increased fiber intake - vegetables, fruits and oats and whole grain. Decreasing fatty food intake. We will repeat cholesterol level in one year.

## 2021-04-30 ENCOUNTER — TELEPHONE (OUTPATIENT)
Dept: INTERNAL MEDICINE CLINIC | Age: 76
End: 2021-04-30

## 2021-04-30 DIAGNOSIS — R03.0 ELEVATED BP WITHOUT DIAGNOSIS OF HYPERTENSION: ICD-10-CM

## 2021-04-30 DIAGNOSIS — E78.00 ELEVATED CHOLESTEROL: ICD-10-CM

## 2021-04-30 DIAGNOSIS — E55.9 VITAMIN D DEFICIENCY: ICD-10-CM

## 2021-04-30 DIAGNOSIS — Z13.220 SCREENING FOR CHOLESTEROL LEVEL: Primary | ICD-10-CM

## 2021-05-03 ENCOUNTER — TELEPHONE (OUTPATIENT)
Dept: INTERNAL MEDICINE CLINIC | Age: 76
End: 2021-05-03

## 2021-05-03 DIAGNOSIS — R79.9 ABNORMAL FINDING OF BLOOD CHEMISTRY, UNSPECIFIED: ICD-10-CM

## 2021-05-03 DIAGNOSIS — Z13.1 SCREENING FOR DIABETES MELLITUS (DM): Primary | ICD-10-CM

## 2021-05-04 ENCOUNTER — HOSPITAL ENCOUNTER (OUTPATIENT)
Dept: LAB | Age: 76
Discharge: HOME | End: 2021-05-04
Payer: MEDICARE

## 2021-05-04 PROCEDURE — 82306 VITAMIN D 25 HYDROXY: CPT

## 2021-05-04 PROCEDURE — 80061 LIPID PANEL: CPT

## 2021-05-04 PROCEDURE — 83036 HEMOGLOBIN GLYCOSYLATED A1C: CPT

## 2021-05-04 PROCEDURE — 80053 COMPREHEN METABOLIC PANEL: CPT

## 2021-05-04 PROCEDURE — 85025 COMPLETE CBC W/AUTO DIFF WBC: CPT

## 2021-05-06 LAB
25(OH)D3+25(OH)D2 SERPL-MCNC: 19.7 NG/ML (ref 30–100)
ALBUMIN SERPL-MCNC: 4.3 G/DL (ref 3.7–4.7)
ALBUMIN/GLOB SERPL: 1.7 {RATIO} (ref 1.2–2.2)
ALP SERPL-CCNC: 83 IU/L (ref 39–117)
ALT SERPL-CCNC: 13 IU/L (ref 0–32)
AST SERPL-CCNC: 19 IU/L (ref 0–40)
BASOPHILS # BLD AUTO: 0 X10E3/UL (ref 0–0.2)
BASOPHILS NFR BLD AUTO: 1 %
BILIRUB SERPL-MCNC: 0.3 MG/DL (ref 0–1.2)
BUN SERPL-MCNC: 24 MG/DL (ref 8–27)
BUN/CREAT SERPL: 26 (ref 12–28)
CALCIUM SERPL-MCNC: 9.2 MG/DL (ref 8.7–10.3)
CHLORIDE SERPL-SCNC: 103 MMOL/L (ref 96–106)
CHOLEST SERPL-MCNC: 192 MG/DL (ref 100–199)
CO2 SERPL-SCNC: 23 MMOL/L (ref 20–29)
CREAT SERPL-MCNC: 0.92 MG/DL (ref 0.57–1)
EOSINOPHIL # BLD AUTO: 0.2 X10E3/UL (ref 0–0.4)
EOSINOPHIL NFR BLD AUTO: 4 %
ERYTHROCYTE [DISTWIDTH] IN BLOOD BY AUTOMATED COUNT: 12.8 % (ref 11.7–15.4)
EST. AVERAGE GLUCOSE BLD GHB EST-MCNC: 111 MG/DL
GLOBULIN SER CALC-MCNC: 2.6 G/DL (ref 1.5–4.5)
GLUCOSE SERPL-MCNC: 109 MG/DL (ref 65–99)
HBA1C MFR BLD: 5.5 % (ref 4.8–5.6)
HCT VFR BLD AUTO: 41 % (ref 34–46.6)
HDLC SERPL-MCNC: 59 MG/DL
HGB BLD-MCNC: 13.8 G/DL (ref 11.1–15.9)
IMM GRANULOCYTES # BLD AUTO: 0 X10E3/UL (ref 0–0.1)
IMM GRANULOCYTES NFR BLD AUTO: 0 %
LDLC SERPL CALC-MCNC: 124 MG/DL (ref 0–99)
LYMPHOCYTES # BLD AUTO: 2.2 X10E3/UL (ref 0.7–3.1)
LYMPHOCYTES NFR BLD AUTO: 35 %
MCH RBC QN AUTO: 31.3 PG (ref 26.6–33)
MCHC RBC AUTO-ENTMCNC: 33.7 G/DL (ref 31.5–35.7)
MCV RBC AUTO: 93 FL (ref 79–97)
MONOCYTES # BLD AUTO: 0.7 X10E3/UL (ref 0.1–0.9)
MONOCYTES NFR BLD AUTO: 12 %
NEUTROPHILS # BLD AUTO: 3 X10E3/UL (ref 1.4–7)
NEUTROPHILS NFR BLD AUTO: 48 %
PLATELET # BLD AUTO: 203 X10E3/UL (ref 150–450)
POTASSIUM SERPL-SCNC: 5.1 MMOL/L (ref 3.5–5.2)
PROT SERPL-MCNC: 6.9 G/DL (ref 6–8.5)
RBC # BLD AUTO: 4.41 X10E6/UL (ref 3.77–5.28)
SODIUM SERPL-SCNC: 139 MMOL/L (ref 134–144)
SPECIMEN STATUS REPORT, ROLRST: NORMAL
TRIGL SERPL-MCNC: 49 MG/DL (ref 0–149)
VLDLC SERPL CALC-MCNC: 9 MG/DL (ref 5–40)
WBC # BLD AUTO: 6.1 X10E3/UL (ref 3.4–10.8)

## 2021-05-07 ENCOUNTER — VIRTUAL VISIT (OUTPATIENT)
Dept: INTERNAL MEDICINE CLINIC | Age: 76
End: 2021-05-07

## 2021-05-07 DIAGNOSIS — Z13.820 SCREENING FOR OSTEOPOROSIS: ICD-10-CM

## 2021-05-07 DIAGNOSIS — Z78.0 POST-MENOPAUSAL: ICD-10-CM

## 2021-05-07 DIAGNOSIS — R06.00 DYSPNEA, UNSPECIFIED TYPE: Primary | ICD-10-CM

## 2021-05-07 PROCEDURE — G8536 NO DOC ELDER MAL SCRN: HCPCS | Performed by: INTERNAL MEDICINE

## 2021-05-07 PROCEDURE — G8432 DEP SCR NOT DOC, RNG: HCPCS | Performed by: INTERNAL MEDICINE

## 2021-05-07 PROCEDURE — G8421 BMI NOT CALCULATED: HCPCS | Performed by: INTERNAL MEDICINE

## 2021-05-07 PROCEDURE — 1101F PT FALLS ASSESS-DOCD LE1/YR: CPT | Performed by: INTERNAL MEDICINE

## 2021-05-07 PROCEDURE — G8427 DOCREV CUR MEDS BY ELIG CLIN: HCPCS | Performed by: INTERNAL MEDICINE

## 2021-05-07 PROCEDURE — 1090F PRES/ABSN URINE INCON ASSESS: CPT | Performed by: INTERNAL MEDICINE

## 2021-05-07 PROCEDURE — 3017F COLORECTAL CA SCREEN DOC REV: CPT | Performed by: INTERNAL MEDICINE

## 2021-05-07 PROCEDURE — G8400 PT W/DXA NO RESULTS DOC: HCPCS | Performed by: INTERNAL MEDICINE

## 2021-05-07 PROCEDURE — 99214 OFFICE O/P EST MOD 30 MIN: CPT | Performed by: INTERNAL MEDICINE

## 2021-05-07 NOTE — PROGRESS NOTES
CC: Complete Physical  dyspnea   HPI:    She is a 77 y.o. female who presents for evaluation of annual exam     She was  last seen one year ago. She has a hx of chronic RU Q pain. She has had evaluation in the past with CT scan of abdomen and no etiology found. Discussed possibly doing a colonoscopy patient declines. She understands colonoscopy as a screening test for colon cancer. Patient is having more dyspnea with exertion. When gardening. She had a negative stress test 2 years ago. I reviewed stress test and echo report. Is essentially normal.  Patient denies PND and orthopnea and leg swelling  Patient denies claudication symptoms  Patient denies dizziness    Advised patient to come in the office to obtain EKG and chest x-ray. Denies chest pain. Declines colonoscopy / screenings  Declines vaccines      This is an established visit conducted via telemedicine with video. The patient has been instructed that this meets HIPAA criteria and acknowledges and agrees to this method of visitation. Pursuant to the emergency declaration under the [facility], [facility] waiver authority and the [employer] and [employer] Act, this Virtual Visit was conducted, with patient's consent, to reduce the patient's risk of exposure to COVID-19 and provide continuity of care for an established patient. Services were provided through a video synchronous discussion virtually to substitute for in-person clinic visit. ROS:  Constitutional: negative for fevers, chills, anorexia and weight loss  Eyes:   negative for visual disturbance,  irritation  ENT:   negative for tinnitus,sore throat,nasal congestion,ear pain, sinus pain.    Respiratory:  negative for cough, hemoptysis, ,wheezing-see HPI  CV:   negative for chest pain, palpitations, lower extremity edema  GI:   negative for nausea, vomiting, diarrhea, abdominal pain,melena  Genitourinary: negative for frequency, dysuria, hematuria  Musculoskel: negative for myalgias, arthralgias, back pain, muscle weakness, joint pain  Neurological:  negative for headaches, dizziness, focal weakness, numbness  Psych:             Negative for depression and anxiety    No past medical history on file. No current outpatient medications on file prior to visit. No current facility-administered medications on file prior to visit. Past Surgical History:   Procedure Laterality Date    HX GYN       hysterctomy    HX ORTHOPAEDIC      foot surgery    HX ORTHOPAEDIC  10/2017    RT.  FRACTRURED HIP       Family History   Problem Relation Age of Onset    No Known Problems Mother     No Known Problems Father      Reviewed and no changes     Social History     Socioeconomic History    Marital status: SINGLE     Spouse name: Not on file    Number of children: Not on file    Years of education: Not on file    Highest education level: Not on file   Occupational History    Not on file   Social Needs    Financial resource strain: Not on file    Food insecurity     Worry: Not on file     Inability: Not on file    Transportation needs     Medical: Not on file     Non-medical: Not on file   Tobacco Use    Smoking status: Never Smoker    Smokeless tobacco: Never Used   Substance and Sexual Activity    Alcohol use: Not Currently     Comment: 1-2 glasses of wine 1-2 a week    Drug use: No    Sexual activity: Not on file   Lifestyle    Physical activity     Days per week: Not on file     Minutes per session: Not on file    Stress: Not on file   Relationships    Social connections     Talks on phone: Not on file     Gets together: Not on file     Attends Caodaism service: Not on file     Active member of club or organization: Not on file     Attends meetings of clubs or organizations: Not on file     Relationship status: Not on file    Intimate partner violence     Fear of current or ex partner: Not on file     Emotionally abused: Not on file     Physically abused: Not on file     Forced sexual activity: Not on file   Other Topics Concern    Not on file   Social History Narrative    Not on file          There were no vitals taken for this visit. Physical Examination:   Gen: well appearing female  HEENT: normal conjunctiva, no audible congestion, patient does not see oral erythema, has MMM  Neck: patient does not feel enlarged or tender LAD or masses  Resp: normal respiratory effort, no audible wheezing. CV: patient does not feel palpitations or heart irregularity  Abd: patient does not feel abdominal tenderness or mass, patient does not notice distension  Extrem: patient does not see swelling in ankles or joints.    Neuro: Alert and oriented, able to answer questions without difficulty, able to move all extremities and walk normally          Lab Results   Component Value Date/Time    WBC 6.1 05/04/2021 12:00 AM    HGB 13.8 05/04/2021 12:00 AM    HCT 41.0 05/04/2021 12:00 AM    PLATELET 435 50/33/8041 12:00 AM    MCV 93 05/04/2021 12:00 AM     Lab Results   Component Value Date/Time    Sodium 139 05/04/2021 12:00 AM    Potassium 5.1 05/04/2021 12:00 AM    Chloride 103 05/04/2021 12:00 AM    CO2 23 05/04/2021 12:00 AM    Anion gap 1 (L) 09/29/2019 12:53 AM    Glucose 109 (H) 05/04/2021 12:00 AM    BUN 24 05/04/2021 12:00 AM    Creatinine 0.92 05/04/2021 12:00 AM    BUN/Creatinine ratio 26 05/04/2021 12:00 AM    GFR est AA 70 05/04/2021 12:00 AM    GFR est non-AA 61 05/04/2021 12:00 AM    Calcium 9.2 05/04/2021 12:00 AM     Lab Results   Component Value Date/Time    Cholesterol, total 192 05/04/2021 12:00 AM    HDL Cholesterol 59 05/04/2021 12:00 AM    LDL, calculated 124 (H) 05/04/2021 12:00 AM    LDL, calculated 130 (H) 03/18/2020 09:14 AM    VLDL, calculated 9 05/04/2021 12:00 AM    VLDL, calculated 12 03/18/2020 09:14 AM    Triglyceride 49 05/04/2021 12:00 AM     Lab Results Component Value Date/Time    TSH 3.22 10/01/2017 04:10 AM     No results found for: PSA, PSA2, PSAR1, Jamin Lorena, PSAR3, [id number], [id number]  Lab Results   Component Value Date/Time    Hemoglobin A1c 5.5 05/04/2021 12:00 AM     Lab Results   Component Value Date/Time    Vitamin D 25-Hydroxy 19.5 (L) 10/04/2017 05:05 AM    VITAMIN D, 25-HYDROXY 19.7 (L) 05/04/2021 12:00 AM       Lab Results   Component Value Date/Time    ALT (SGPT) 13 05/04/2021 12:00 AM    Alk. phosphatase 83 05/04/2021 12:00 AM    Bilirubin, total 0.3 05/04/2021 12:00 AM           Assessment/Plan:    1. Dyspnea, unspecified type  Notes increased dyspnea when exercising. Patient had a negative stress test about 2 years ago and a normal echocardiogram  Advised patient to come to the office for an EKG and chest x-ray. Will determine if further stress test is warranted. No history of diabetes, mildly elevated cholesterol. Non-smoker  - XR CHEST PA LAT; Future  - XR CHEST PA LAT; Future    2. Screening for osteoporosis  - DEXA BONE DENSITY STUDY AXIAL; Future    3. Post-menopausal  - DEXA BONE DENSITY STUDY AXIAL; Future    4. Elevated cholesterol: Lifestyle recommendations made  Patient declines vaccines      Follow-up and Dispositions    · Return in about 3 months (around 8/7/2021). Follow-up next week in the office for Stephanie Pierre MD    This is an established visit conducted via real time video and audio telemedicine. The patient has been instructed that this meets HIPAA criteria and acknowledges and agrees to this method of visitation.

## 2021-05-07 NOTE — PROGRESS NOTES
Reviewed record in preparation for visit and have obtained necessary documentation. Identified pt with two pt identifiers(name and ). Chief Complaint   Patient presents with    Complete Physical       Health Maintenance Due   Topic Date Due    Hepatitis C Test  Never done    COVID-19 Vaccine (1) Never done    DTaP/Tdap/Td  (1 - Tdap) Never done    Shingles Vaccine (1 of 2) Never done    Colorectal Screening  Never done    Bone Mineral Density   Never done    Pneumococcal Vaccine (1 of 1 - PPSV23) Never done    Annual Well Visit  Never done       Ms. Arthur Hicks has a reminder for a \"due or due soon\" health maintenance. I have asked that she discuss this further with her primary care provider for follow-up on this health maintenance. Coordination of Care Questionnaire:  :     1) Have you been to an emergency room, urgent care clinic since your last visit? no   Hospitalized since your last visit? no             2) Have you seen or consulted any other health care providers outside of [address] [place] [address] since your last visit? no  (Include any pap smears or colon screenings in this section.)    3) In the event something were to happen to you and you were unable to speak on your behalf, do you have an Advance Directive/ Living Will in place stating your wishes? NO    Do you have an Advance Directive on file? no    4) Are you interested in receiving information on Advance Directives? NO    Patient is accompanied by self I have received verbal consent from Pastora Knox to discuss any/all medical information while they are present in the room.

## 2021-05-07 NOTE — LETTER
5/7/2021 Ms. Estephania Morris [id number] [place] [address] [id number] Dear Estephania Morris: Please find your most recent results below. Resulted Orders VITAMIN D, 25 HYDROXY Result Value Ref Range VITAMIN D, 25-HYDROXY 19.7 (L) 30.0 - 100.0 ng/mL Narrative Specimen Comment: A duplicate report has been generated due to demographic  
updates. Performed at:  [address]  [id number] : Kavya Payne MD, Phone:  [phone] METABOLIC PANEL, COMPREHENSIVE Result Value Ref Range Glucose 109 (H) 65 - 99 mg/dL BUN 24 8 - 27 mg/dL Creatinine 0.92 0.57 - 1.00 mg/dL GFR est non-AA 61 >59 mL/min/1.73 GFR est AA 70 >59 mL/min/1.73  
 BUN/Creatinine ratio 26 12 - 28 Sodium 139 134 - 144 mmol/L Potassium 5.1 3.5 - 5.2 mmol/L Chloride 103 96 - 106 mmol/L  
 CO2 23 20 - 29 mmol/L Calcium 9.2 8.7 - 10.3 mg/dL Protein, total 6.9 6.0 - 8.5 g/dL Albumin 4.3 3.7 - 4.7 g/dL GLOBULIN, TOTAL 2.6 1.5 - 4.5 g/dL A-G Ratio 1.7 1.2 - 2.2 Bilirubin, total 0.3 0.0 - 1.2 mg/dL Alk. phosphatase 83 39 - 117 IU/L  
 AST (SGOT) 19 0 - 40 IU/L  
 ALT (SGPT) 13 0 - 32 IU/L Narrative Specimen Comment: A duplicate report has been generated due to demographic  
updates. Performed at:  [address]  [id number] : Kavya Payne MD, Phone:  [phone] CBC WITH AUTOMATED DIFF Result Value Ref Range WBC 6.1 3.4 - 10.8 x10E3/uL  
 RBC 4.41 3.77 - 5.28 x10E6/uL HGB 13.8 11.1 - 15.9 g/dL HCT 41.0 34.0 - 46.6 % MCV 93 79 - 97 fL  
 MCH 31.3 26.6 - 33.0 pg  
 MCHC 33.7 31.5 - 35.7 g/dL  
 RDW 12.8 11.7 - 15.4 % PLATELET 755 863 - 894 x10E3/uL NEUTROPHILS 48 Not Estab. % Lymphocytes 35 Not Estab. % MONOCYTES 12 Not Estab. % EOSINOPHILS 4 Not Estab. % BASOPHILS 1 Not Estab. %  
 ABS. NEUTROPHILS 3.0 1.4 - 7.0 x10E3/uL Abs Lymphocytes 2.2 0.7 - 3.1 x10E3/uL  
 ABS. MONOCYTES 0.7 0.1 - 0.9 x10E3/uL  
 ABS. EOSINOPHILS 0.2 0.0 - 0.4 x10E3/uL  
 ABS. BASOPHILS 0.0 0.0 - 0.2 x10E3/uL IMMATURE GRANULOCYTES 0 Not Estab. %  
 ABS. IMM. GRANS. 0.0 0.0 - 0.1 x10E3/uL Narrative Specimen Comment: A duplicate report has been generated due to demographic  
updates. Performed at:  [address]  [id number] : Fany Conklin MD, Phone:  [phone] LIPID PANEL Result Value Ref Range Cholesterol, total 192 100 - 199 mg/dL Triglyceride 49 0 - 149 mg/dL HDL Cholesterol 59 >39 mg/dL VLDL, calculated 9 5 - 40 mg/dL LDL, calculated 124 (H) 0 - 99 mg/dL Narrative Specimen Comment: A duplicate report has been generated due to demographic  
updates. Performed at:  [address]  [id number] : Fany Conklin MD, Phone:  [phone] HEMOGLOBIN A1C WITH EAG Result Value Ref Range Hemoglobin A1c 5.5 4.8 - 5.6 % Estimated average glucose 111 mg/dL Narrative Specimen Comment: A duplicate report has been generated due to demographic  
updates. Performed at:  [address]  [id number] : Fany Conklin MD, Phone:  [phone] SPECIMEN STATUS REPORT Result Value Ref Range SPECIMEN STATUS REPORT COMMENT Narrative Specimen Comment: A duplicate report has been generated due to demographic  
updates. Performed at:  [address]  [id number] : Fany Conklin MD, Phone:  [phone] RECOMMENDATIONS: 
Vitamin D 3 is low recommend that you take 2000 units of vitamin D3 Please call me if you have any questions: [phone] Sincerely, Desean Orona MD

## 2021-05-10 ENCOUNTER — TELEPHONE (OUTPATIENT)
Dept: INTERNAL MEDICINE CLINIC | Age: 76
End: 2021-05-10

## 2021-05-18 RX ORDER — ERGOCALCIFEROL 1.25 MG/1
50000 CAPSULE ORAL
Qty: 12 CAP | Refills: 3 | Status: SHIPPED | OUTPATIENT
Start: 2021-05-18

## 2021-05-18 NOTE — TELEPHONE ENCOUNTER
Vitamin D is low recommend   Recommend vitamin D loading dose of 50,000 iu  once a week for 12 weeks, once done with loading dose, take 2,000 iu vitamin D3 once a day over the counter to maintain level.    Normal kidney and liver and blood count  Slight elevation in cholesterol is better than previous   No diabetes

## 2021-05-25 ENCOUNTER — TELEPHONE (OUTPATIENT)
Dept: INTERNAL MEDICINE CLINIC | Age: 76
End: 2021-05-25

## 2021-05-25 NOTE — TELEPHONE ENCOUNTER
Called out and spoke to pt. Two pt identifiers confirmed. Informed pt about her chest x-ray but pt wanted to know if her EKG was okay. I told her if she had it done here  would've have mentioned if she was concerned about it. Pt verbalized understanding of information discussed w/ no further questions at this time.

## 2021-05-25 NOTE — TELEPHONE ENCOUNTER
#[phone]  Pt states she is still waiting on test results from 5-21-21. Pt did not say what results. Please call pt with results.

## 2021-05-25 NOTE — TELEPHONE ENCOUNTER
Called out and spoke to pt. Two pt identifiers confirmed. I informed pt, she will be contacting Rossy Burton. Pt verbalized understanding of information discussed w/ no further questions at this time.

## 2021-05-27 ENCOUNTER — TELEPHONE (OUTPATIENT)
Dept: INTERNAL MEDICINE CLINIC | Age: 76
End: 2021-05-27

## 2021-05-27 NOTE — TELEPHONE ENCOUNTER
----- Message from Alexa Trujillo sent at 5/27/2021  1:28 PM EDT -----  Regarding: Dr. Cm Signs Message/Vendor Calls    Caller's first and last name: pt       Reason for call: Wants to know if she can get an order for a MRI on her left shoulder to make sure she didn't do any extensive damage.        Callback required yes/no and why: yes, to discuss       Best contact number(s): [phone]      Message from [facility]

## 2021-06-01 ENCOUNTER — TELEPHONE (OUTPATIENT)
Dept: INTERNAL MEDICINE CLINIC | Age: 76
End: 2021-06-01

## 2021-06-01 ENCOUNTER — VIRTUAL VISIT (OUTPATIENT)
Dept: INTERNAL MEDICINE CLINIC | Age: 76
End: 2021-06-01

## 2021-06-01 NOTE — TELEPHONE ENCOUNTER
Sebastian Hemphill with patient today for a phone visit. I ended up canceling her phone visit and told her I would message you. She has been seeing an acupuncturist for several months for left shoulder pain. She did not mention the symptoms when she saw you on May 7 for her annual physical.  She is requesting an MRI. I advised her that she needs an in office appointment for an exam and x-rays. At that point it would be determined whether an MRI is appropriate. I did give her the option of going directly to [address] [place] [address]. I recommended Dr. Orquidea Enamorado. The patient has an appointment with Dr. Rock Ling this week and she states she will discuss it with him instead.

## 2022-03-19 PROBLEM — S72.21XA DISPLACED SUBTROCHANTERIC FRACTURE OF RIGHT FEMUR, INITIAL ENCOUNTER FOR CLOSED FRACTURE (HCC): Status: ACTIVE | Noted: 2017-09-30

## 2022-03-19 PROBLEM — R03.0 ELEVATED BP WITHOUT DIAGNOSIS OF HYPERTENSION: Status: ACTIVE | Noted: 2017-09-30

## 2022-07-06 ENCOUNTER — TRANSCRIBE ORDER (OUTPATIENT)
Dept: SCHEDULING | Age: 77
End: 2022-07-06

## 2022-07-06 DIAGNOSIS — K59.00 CONSTIPATION: Primary | ICD-10-CM

## 2022-07-06 DIAGNOSIS — R10.9 STOMACH DISCOMFORT: ICD-10-CM

## 2022-07-21 ENCOUNTER — HOSPITAL ENCOUNTER (OUTPATIENT)
Dept: CT IMAGING | Age: 77
Discharge: HOME | End: 2022-07-21
Payer: MEDICARE

## 2022-07-21 ENCOUNTER — APPOINTMENT (OUTPATIENT)
Dept: CT IMAGING | Age: 77
End: 2022-07-21
Payer: MEDICARE

## 2022-07-21 DIAGNOSIS — K59.00 CONSTIPATION: ICD-10-CM

## 2022-07-21 DIAGNOSIS — R10.9 STOMACH DISCOMFORT: ICD-10-CM

## 2022-07-21 LAB — CREAT BLD-MCNC: 0.9 MG/DL (ref 0.6–1.3)

## 2022-07-21 PROCEDURE — 82565 ASSAY OF CREATININE: CPT

## 2022-07-21 PROCEDURE — 74177 CT ABD & PELVIS W/CONTRAST: CPT

## 2022-07-21 PROCEDURE — 71260 CT THORAX DX C+: CPT

## 2022-07-21 PROCEDURE — 74011000636 HC RX REV CODE- 636

## 2022-07-21 PROCEDURE — 74011000250 HC RX REV CODE- 250

## 2022-07-21 RX ORDER — BARIUM SULFATE 20 MG/ML
900 SUSPENSION ORAL
Status: COMPLETED | OUTPATIENT
Start: 2022-07-21 | End: 2022-07-21

## 2022-07-21 RX ADMIN — BARIUM SULFATE 900 ML: 21 SUSPENSION ORAL at 13:02

## 2022-07-21 RX ADMIN — IOPAMIDOL 100 ML: 755 INJECTION, SOLUTION INTRAVENOUS at 13:01

## 2022-10-14 ENCOUNTER — TRANSCRIBE ORDER (OUTPATIENT)
Dept: SCHEDULING | Age: 77
End: 2022-10-14

## 2022-10-14 DIAGNOSIS — J98.4 DISEASE OF LUNG: Primary | ICD-10-CM

## 2022-10-14 DIAGNOSIS — R06.89 DYSPNEA AND RESPIRATORY ABNORMALITY: ICD-10-CM

## 2022-10-14 DIAGNOSIS — R06.00 DYSPNEA AND RESPIRATORY ABNORMALITY: ICD-10-CM

## 2022-10-26 ENCOUNTER — HOSPITAL ENCOUNTER (OUTPATIENT)
Dept: CT IMAGING | Age: 77
Discharge: HOME | End: 2022-10-26
Attending: NURSE PRACTITIONER
Payer: MEDICARE

## 2022-10-26 DIAGNOSIS — J98.4 DISEASE OF LUNG: ICD-10-CM

## 2022-10-26 DIAGNOSIS — R06.89 DYSPNEA AND RESPIRATORY ABNORMALITY: ICD-10-CM

## 2022-10-26 DIAGNOSIS — R06.00 DYSPNEA AND RESPIRATORY ABNORMALITY: ICD-10-CM

## 2022-10-26 LAB — CREAT BLD-MCNC: 0.9 MG/DL (ref 0.6–1.3)

## 2022-10-26 PROCEDURE — 71260 CT THORAX DX C+: CPT

## 2022-10-26 PROCEDURE — 74011000636 HC RX REV CODE- 636: Performed by: NURSE PRACTITIONER

## 2022-10-26 PROCEDURE — 82565 ASSAY OF CREATININE: CPT

## 2022-10-26 RX ADMIN — IOPAMIDOL 80 ML: 755 INJECTION, SOLUTION INTRAVENOUS at 11:13

## 2022-12-09 ENCOUNTER — TRANSCRIBE ORDER (OUTPATIENT)
Dept: SCHEDULING | Age: 77
End: 2022-12-09

## 2022-12-09 DIAGNOSIS — R91.8 PULMONARY NODULES: Primary | ICD-10-CM

## 2022-12-24 ENCOUNTER — HOSPITAL ENCOUNTER (EMERGENCY)
Age: 77
Discharge: HOME | End: 2022-12-25
Attending: EMERGENCY MEDICINE
Payer: MEDICARE

## 2022-12-24 VITALS
BODY MASS INDEX: 28.5 KG/M2 | HEART RATE: 83 BPM | HEIGHT: 65 IN | WEIGHT: 171.08 LBS | OXYGEN SATURATION: 100 % | DIASTOLIC BLOOD PRESSURE: 96 MMHG | SYSTOLIC BLOOD PRESSURE: 197 MMHG | TEMPERATURE: 97.5 F | RESPIRATION RATE: 18 BRPM

## 2022-12-24 DIAGNOSIS — M62.838 TRAPEZIUS MUSCLE SPASM: Primary | ICD-10-CM

## 2022-12-24 PROCEDURE — 96372 THER/PROPH/DIAG INJ SC/IM: CPT

## 2022-12-24 PROCEDURE — 99284 EMERGENCY DEPT VISIT MOD MDM: CPT

## 2022-12-25 ENCOUNTER — APPOINTMENT (OUTPATIENT)
Dept: CT IMAGING | Age: 77
End: 2022-12-25
Attending: EMERGENCY MEDICINE
Payer: MEDICARE

## 2022-12-25 VITALS
SYSTOLIC BLOOD PRESSURE: 145 MMHG | BODY MASS INDEX: 28.58 KG/M2 | TEMPERATURE: 97.2 F | HEIGHT: 65 IN | RESPIRATION RATE: 16 BRPM | HEART RATE: 71 BPM | OXYGEN SATURATION: 97 % | DIASTOLIC BLOOD PRESSURE: 69 MMHG | WEIGHT: 171.52 LBS

## 2022-12-25 DIAGNOSIS — M47.812 CERVICAL ARTHRITIS: ICD-10-CM

## 2022-12-25 DIAGNOSIS — M54.2 NECK PAIN, ACUTE: ICD-10-CM

## 2022-12-25 DIAGNOSIS — G44.201 ACUTE INTRACTABLE TENSION-TYPE HEADACHE: Primary | ICD-10-CM

## 2022-12-25 LAB
ALBUMIN SERPL-MCNC: 3.5 G/DL (ref 3.5–5)
ALBUMIN/GLOB SERPL: 0.9 {RATIO} (ref 1.1–2.2)
ALP SERPL-CCNC: 83 U/L (ref 45–117)
ALT SERPL-CCNC: 19 U/L (ref 12–78)
ANION GAP SERPL CALC-SCNC: 4 MMOL/L (ref 5–15)
AST SERPL-CCNC: 14 U/L (ref 15–37)
BASOPHILS # BLD: 0 K/UL (ref 0–0.1)
BASOPHILS NFR BLD: 0 % (ref 0–1)
BILIRUB SERPL-MCNC: 0.3 MG/DL (ref 0.2–1)
BUN SERPL-MCNC: 24 MG/DL (ref 6–20)
BUN/CREAT SERPL: 26 (ref 12–20)
CA-I BLD-SCNC: 1.22 MMOL/L (ref 1.12–1.32)
CALCIUM SERPL-MCNC: 8.8 MG/DL (ref 8.5–10.1)
CHLORIDE SERPL-SCNC: 108 MMOL/L (ref 97–108)
CO2 SERPL-SCNC: 27 MMOL/L (ref 21–32)
CREAT SERPL-MCNC: 0.93 MG/DL (ref 0.55–1.02)
DIFFERENTIAL METHOD BLD: NORMAL
EOSINOPHIL # BLD: 0.2 K/UL (ref 0–0.4)
EOSINOPHIL NFR BLD: 3 % (ref 0–7)
ERYTHROCYTE [DISTWIDTH] IN BLOOD BY AUTOMATED COUNT: 12.4 % (ref 11.5–14.5)
GLOBULIN SER CALC-MCNC: 4 G/DL (ref 2–4)
GLUCOSE SERPL-MCNC: 90 MG/DL (ref 65–100)
HCT VFR BLD AUTO: 44.7 % (ref 35–47)
HGB BLD-MCNC: 14.8 G/DL (ref 11.5–16)
IMM GRANULOCYTES # BLD AUTO: 0 K/UL (ref 0–0.04)
IMM GRANULOCYTES NFR BLD AUTO: 0 % (ref 0–0.5)
LYMPHOCYTES # BLD: 2.3 K/UL (ref 0.8–3.5)
LYMPHOCYTES NFR BLD: 33 % (ref 12–49)
MCH RBC QN AUTO: 31 PG (ref 26–34)
MCHC RBC AUTO-ENTMCNC: 33.1 G/DL (ref 30–36.5)
MCV RBC AUTO: 93.7 FL (ref 80–99)
MONOCYTES # BLD: 0.9 K/UL (ref 0–1)
MONOCYTES NFR BLD: 13 % (ref 5–13)
NEUTS SEG # BLD: 3.5 K/UL (ref 1.8–8)
NEUTS SEG NFR BLD: 51 % (ref 32–75)
NRBC # BLD: 0 K/UL (ref 0–0.01)
NRBC BLD-RTO: 0 PER 100 WBC
PLATELET # BLD AUTO: 202 K/UL (ref 150–400)
PMV BLD AUTO: 10.4 FL (ref 8.9–12.9)
POTASSIUM SERPL-SCNC: 4.1 MMOL/L (ref 3.5–5.1)
PROT SERPL-MCNC: 7.5 G/DL (ref 6.4–8.2)
RBC # BLD AUTO: 4.77 M/UL (ref 3.8–5.2)
SODIUM SERPL-SCNC: 139 MMOL/L (ref 136–145)
WBC # BLD AUTO: 7 K/UL (ref 3.6–11)

## 2022-12-25 PROCEDURE — 96372 THER/PROPH/DIAG INJ SC/IM: CPT

## 2022-12-25 PROCEDURE — 74011250636 HC RX REV CODE- 250/636: Performed by: EMERGENCY MEDICINE

## 2022-12-25 PROCEDURE — 85025 COMPLETE CBC W/AUTO DIFF WBC: CPT

## 2022-12-25 PROCEDURE — 82330 ASSAY OF CALCIUM: CPT

## 2022-12-25 PROCEDURE — 74011250637 HC RX REV CODE- 250/637: Performed by: EMERGENCY MEDICINE

## 2022-12-25 PROCEDURE — 74011000636 HC RX REV CODE- 636: Performed by: EMERGENCY MEDICINE

## 2022-12-25 PROCEDURE — 70496 CT ANGIOGRAPHY HEAD: CPT

## 2022-12-25 PROCEDURE — 74011000250 HC RX REV CODE- 250: Performed by: EMERGENCY MEDICINE

## 2022-12-25 PROCEDURE — 80053 COMPREHEN METABOLIC PANEL: CPT

## 2022-12-25 PROCEDURE — 80047 BASIC METABLC PNL IONIZED CA: CPT

## 2022-12-25 PROCEDURE — 70450 CT HEAD/BRAIN W/O DYE: CPT

## 2022-12-25 PROCEDURE — 36415 COLL VENOUS BLD VENIPUNCTURE: CPT

## 2022-12-25 RX ORDER — LIDOCAINE 4 G/100G
1 PATCH TOPICAL ONCE
Status: DISCONTINUED | OUTPATIENT
Start: 2022-12-25 | End: 2022-12-25 | Stop reason: HOSPADM

## 2022-12-25 RX ORDER — ONDANSETRON 2 MG/ML
4 INJECTION INTRAMUSCULAR; INTRAVENOUS
Status: COMPLETED | OUTPATIENT
Start: 2022-12-25 | End: 2022-12-25

## 2022-12-25 RX ORDER — MORPHINE SULFATE 2 MG/ML
4 INJECTION, SOLUTION INTRAMUSCULAR; INTRAVENOUS
Status: COMPLETED | OUTPATIENT
Start: 2022-12-25 | End: 2022-12-25

## 2022-12-25 RX ORDER — OXYCODONE AND ACETAMINOPHEN 5; 325 MG/1; MG/1
1 TABLET ORAL
Qty: 10 TABLET | Refills: 0 | Status: SHIPPED | OUTPATIENT
Start: 2022-12-25 | End: 2022-12-28

## 2022-12-25 RX ORDER — DICLOFENAC POTASSIUM 50 MG/1
50 TABLET, FILM COATED ORAL
Qty: 14 TABLET | Refills: 0 | Status: SHIPPED | OUTPATIENT
Start: 2022-12-25

## 2022-12-25 RX ORDER — DIAZEPAM 5 MG/1
5 TABLET ORAL
Status: COMPLETED | OUTPATIENT
Start: 2022-12-25 | End: 2022-12-25

## 2022-12-25 RX ORDER — LIDOCAINE 4 G/100G
1 PATCH TOPICAL
Status: DISCONTINUED | OUTPATIENT
Start: 2022-12-25 | End: 2022-12-25 | Stop reason: HOSPADM

## 2022-12-25 RX ORDER — METHOCARBAMOL 750 MG/1
750 TABLET, FILM COATED ORAL
Qty: 21 TABLET | Refills: 0 | Status: SHIPPED | OUTPATIENT
Start: 2022-12-25

## 2022-12-25 RX ORDER — KETOROLAC TROMETHAMINE 30 MG/ML
15 INJECTION, SOLUTION INTRAMUSCULAR; INTRAVENOUS
Status: COMPLETED | OUTPATIENT
Start: 2022-12-25 | End: 2022-12-25

## 2022-12-25 RX ADMIN — MORPHINE SULFATE 4 MG: 2 INJECTION, SOLUTION INTRAMUSCULAR; INTRAVENOUS at 13:12

## 2022-12-25 RX ADMIN — MORPHINE SULFATE 4 MG: 2 INJECTION, SOLUTION INTRAMUSCULAR; INTRAVENOUS at 15:34

## 2022-12-25 RX ADMIN — ONDANSETRON 4 MG: 2 INJECTION INTRAMUSCULAR; INTRAVENOUS at 15:34

## 2022-12-25 RX ADMIN — DIAZEPAM 5 MG: 5 TABLET ORAL at 00:23

## 2022-12-25 RX ADMIN — KETOROLAC TROMETHAMINE 15 MG: 30 INJECTION, SOLUTION INTRAMUSCULAR at 00:24

## 2022-12-25 RX ADMIN — IOPAMIDOL 100 ML: 755 INJECTION, SOLUTION INTRAVENOUS at 14:26

## 2022-12-25 RX ADMIN — ONDANSETRON 4 MG: 2 INJECTION INTRAMUSCULAR; INTRAVENOUS at 13:10

## 2022-12-25 NOTE — DISCHARGE INSTRUCTIONS
You are seen in the emergency department for your symptoms. Please use the anti-inflammatory medicines and muscle relaxers as needed. Please follow-up with your primary care doctor return for new or worsening symptoms at any time.

## 2022-12-25 NOTE — ED NOTES
Pt discharged by Aravind Daniel RN. Discharge instructions discussed and pt given opportunity to ask questions.  Pt out of ED via wheelchair

## 2022-12-25 NOTE — ED PROVIDER NOTES
EMERGENCY DEPARTMENT HISTORY AND PHYSICAL EXAM      Date: 12/24/2022  Patient Name: Oh Bar    History of Presenting Illness     Chief Complaint   Patient presents with    Neck Pain     Patient arrives with complaint of left sided neck pain since Thursday after a chiropractor appointment. Patient denies any blurred vision, tingling or numbness. History Provided By: Patient and Patient's     HPI: Oh Bar, 69 y.o. female presents to the ED with cc of left-sided neck pain. 30-year-old female presents who denies any significant past medical history presents with left-sided neck pain. Patient reports that symptoms began Thursday after undergoing chiropractic manipulation. Patient reports left-sided paraspinal neck pain that began Thursday and has been constant. It is worse with movement and improved with nothing. It does radiate to the occiput. Denies headache. Denies arm pain. Denies chest pain or shortness of breath. Denies dizziness or lightheadedness. Has taken Tylenol and used Biofreeze without relief. There are no other complaints, changes, or physical findings at this time. PCP: None    No current facility-administered medications on file prior to encounter. Current Outpatient Medications on File Prior to Encounter   Medication Sig Dispense Refill    [DISCONTINUED] ergocalciferol (ERGOCALCIFEROL) 1,250 mcg (50,000 unit) capsule Take 1 Cap by mouth every seven (7) days. 12 Cap 3       Past History     Past Medical History:  History reviewed. No pertinent past medical history. Past Surgical History:  Past Surgical History:   Procedure Laterality Date    HX GYN       hysterctomy    HX ORTHOPAEDIC      foot surgery    HX ORTHOPAEDIC  10/2017    RTFrank Cortes April       Family History:  Family History   Problem Relation Age of Onset    No Known Problems Mother     No Known Problems Father        Social History:  Social History     Tobacco Use    Smoking status: Never    Smokeless tobacco: Never   Substance Use Topics    Alcohol use: Yes     Comment: socially     Drug use: No       Allergies:  No Known Allergies      Review of Systems   Review of Systems   Constitutional:  Negative for activity change, chills and fever. HENT:  Negative for facial swelling and voice change. Eyes:  Negative for redness. Respiratory:  Negative for cough, shortness of breath and wheezing. Cardiovascular:  Negative for chest pain and leg swelling. Gastrointestinal:  Negative for abdominal pain, diarrhea, nausea and vomiting. Genitourinary:  Negative for decreased urine volume. Musculoskeletal:  Positive for myalgias and neck pain. Negative for gait problem and neck stiffness. Skin:  Negative for pallor and rash. Neurological:  Negative for tremors and facial asymmetry. Psychiatric/Behavioral:  Negative for agitation. All other systems reviewed and are negative. Physical Exam   Physical Exam  Vitals and nursing note reviewed. Constitutional:       Comments: 71-year-old female, sitting upright in stretcher, no acute distress   HENT:      Head: Normocephalic and atraumatic. Neck:      Comments: There is no carotid bruit. There is significant reproducible tenderness along the left trapezius muscle tender on palpation. Cardiovascular:      Rate and Rhythm: Normal rate and regular rhythm. Heart sounds: No murmur heard. No friction rub. No gallop. Pulmonary:      Effort: Pulmonary effort is normal.      Breath sounds: Normal breath sounds. Abdominal:      Palpations: Abdomen is soft. Tenderness: There is no abdominal tenderness. Musculoskeletal:         General: Tenderness present. No swelling. Normal range of motion. Cervical back: Normal range of motion. Tenderness present. No rigidity. Skin:     General: Skin is warm. Capillary Refill: Capillary refill takes less than 2 seconds. Findings: No rash (No zoster).    Neurological: General: No focal deficit present. Mental Status: She is alert. Sensory: No sensory deficit. Motor: No weakness. Comments: Left upper extremity with 2+ radial and ulnar pulses. There is equal sensation in all dermatomes and equal strength with shoulder shrugging, elbow flexion and extension and median/radial and ulnar nerves. Psychiatric:         Mood and Affect: Mood normal.       Diagnostic Study Results     Labs -   No results found for this or any previous visit (from the past 12 hour(s)). Radiologic Studies -   No orders to display     CT Results  (Last 48 hours)      None          CXR Results  (Last 48 hours)      None            Medical Decision Making   I am the first provider for this patient. I reviewed the vital signs, available nursing notes, past medical history, past surgical history, family history and social history. Vital Signs-Reviewed the patient's vital signs. Patient Vitals for the past 12 hrs:   Temp Pulse Resp BP SpO2   12/24/22 2357 97.5 °F (36.4 °C) 83 18 (!) 197/96 100 %     Records Reviewed: Nursing Notes and Old Medical Records    Provider Notes (Medical Decision Making): This is a 80-year-old female who presents with reproducible left paracervical trapezius neck pain after chiropractic manipulation. She has had a mildly elevated blood pressure which is likely asymptomatic hypertension or secondary to pain. Given the reproducibility, I do not believe this is referred ACS or abdominal pathology. She has neurologically intact. The pain is not midline, doubt pathologic cervical fracture. I do not believe this is a dissection as patient has no hard neurologic deficits. We will treat with Toradol, lidocaine patch and muscle relaxer. Discussed supportive care and PCP follow-up. Patient agreeable to plan. Do not believe imaging or labs are necessary at this time. ED Course:   Initial assessment performed.  The patients presenting problems have been discussed, and they are in agreement with the care plan formulated and outlined with them. I have encouraged them to ask questions as they arise throughout their visit. Benjamin Elliott MD      Disposition:    Discharged    DISCHARGE PLAN:  1. Discharge Medication List as of 12/25/2022 12:32 AM        START taking these medications    Details   diclofenac potassium (CATAFLAM) 50 mg tablet Take 1 Tablet by mouth two (2) times daily as needed for Pain., Print, Disp-14 Tablet, R-0      methocarbamoL (ROBAXIN) 750 mg tablet Take 1 Tablet by mouth three (3) times daily as needed for Muscle Spasm(s). , Print, Disp-21 Tablet, R-0           2. Follow-up Information       Follow up With Specialties Details Why Contact Info    [facility] EMERGENCY DEPT Emergency Medicine  If symptoms worsen [address] [facility] [address]   [address] [address] [phone]          3. Return to ED if worse     Diagnosis     Clinical Impression:   1. Trapezius muscle spasm        Attestations:    Benjamin Elliott MD        Please note that this dictation was completed with [employer], the computer voice recognition software. Quite often unanticipated grammatical, syntax, homophones, and other interpretive errors are inadvertently transcribed by the computer software. Please disregard these errors. Please excuse any errors that have escaped final proofreading. Thank you.

## 2022-12-25 NOTE — ED PROVIDER NOTES
EMERGENCY DEPARTMENT HISTORY AND PHYSICAL EXAM      Date: 12/25/2022  Patient Name: Sharon Montiel    History of Presenting Illness    Chief Complaint  Patient presents with    Neck Pain    Pt had been to chiropractor on Thursday and pt began with excrutiating neck pain which she thinks is pinched nerve, pain is primarily left side of neck . Pt seen here treated 12 hours ago. Pt pain is worse. She took her muscle relaxer an hour and a half ago. History Provided By: Patient and records    HPI: Sharon Montiel, 69 y.o. female presents to the ED with cc of neck pain. Patient has no significant history. She went to a chiropractor 3 days ago, and has been having excruciating neck pain since then. Pain involves posterior neck but is primarily on the left side of the neck. She says it is 100 out of 10 pain currently. She also has a headache, which is 10 out of 10 in severity. She denies any other trauma. She denies fever, numbness, weakness or dizziness. She is not on a blood thinner. She was seen in the ER yesterday for the same complaint, and was diagnosed with muscle spasm. Pain in the neck did decrease to a 10 out of 10 with Valium while in the ER, but the diclofenac and Robaxin that she was prescribed have not helped her symptoms at all. She denies chest pain, cough, shortness of breath or abdominal pain. Denies visual changes. There are no other complaints, changes, or physical findings at this time.     PCP: None    Current Facility-Administered Medications on File Prior to Encounter  Medication  Dose  Route  Frequency  Provider  Last Rate  Last Admin    [COMPLETED] ketorolac (TORADOL) injection 15 mg   15 mg  Intra[employer]USCular  NOW  Yael Brock MD     15 mg at 12/25/22 0024    [COMPLETED] diazePAM (VALIUM) tablet 5 mg   5 mg  Oral  NOW  Yael Brock MD     5 mg at 12/25/22 0023    [COMPLETED] lidocaine 4 % patch 1 Patch   1 Patch  TransDERmal  ONCE  Yael Brock MD     1 Patch at 12/25/22 0023    Current Outpatient Medications on File Prior to Encounter  Medication  Sig  Dispense  Refill    diclofenac potassium (CATAFLAM) 50 mg tablet  Take 1 Tablet by mouth two (2) times daily as needed for Pain. 14 Tablet  0    methocarbamoL (ROBAXIN) 750 mg tablet  Take 1 Tablet by mouth three (3) times daily as needed for Muscle Spasm(s). 21 Tablet  0    [DISCONTINUED] ergocalciferol (ERGOCALCIFEROL) 1,250 mcg (50,000 unit) capsule  Take 1 Cap by mouth every seven (7) days. 12 Cap  3      Past History    Past Medical History:  No past medical history on file. Past Surgical History:  Past Surgical History:  Procedure  Laterality  Date    HX GYN     hysterctomy    HX ORTHOPAEDIC    foot surgery    HX ORTHOPAEDIC  10/2017    RT. Lynda Degree      Family History:  Family History  Problem  Relation  Age of Onset    No Known Problems  Mother    No Known Problems  Father      Social History:  Social History    Tobacco Use    Smoking status:  Never    Smokeless tobacco:  Never  Substance Use Topics    Alcohol use:  Yes    Comment: socially     Drug use:  No      Allergies:  No Known Allergies      Review of Systems  Review of Systems   Constitutional:  Negative for fever. HENT:  Negative for congestion. Eyes: Negative. Respiratory:  Negative for shortness of breath. Cardiovascular:  Negative for chest pain. Gastrointestinal:  Negative for abdominal pain. Endocrine: Negative for heat intolerance. Genitourinary: Negative. Musculoskeletal:  Positive for neck pain. Negative for back pain. Skin:  Negative for rash. Allergic/Immunologic: Negative for immunocompromised state. Neurological:  Positive for headaches. Hematological:  Does not bruise/bleed easily. Psychiatric/Behavioral: Negative. All other systems reviewed and are negative. Physical Exam  Physical Exam  Vitals and nursing note reviewed. Constitutional:       General: She is not in acute distress. Appearance: She is well-developed. HENT:      Head: Normocephalic. Cardiovascular:      Rate and Rhythm: Normal rate and regular rhythm. Pulses: Normal pulses. Heart sounds: Normal heart sounds. Pulmonary:      Effort: Pulmonary effort is normal.      Breath sounds: Normal breath sounds. Abdominal:      General: Bowel sounds are normal.      Palpations: Abdomen is soft. Tenderness: There is no abdominal tenderness. Musculoskeletal:         General: Normal range of motion. Cervical back: Normal range of motion and neck supple. Skin:     General: Skin is warm and dry. Neurological:      General: No focal deficit present. Mental Status: She is alert and oriented to person, place, and time. Psychiatric:         Mood and Affect: Mood normal.         Behavior: Behavior normal.       Diagnostic Study Results    Labs -     Recent Results (from the past 12 hour(s))  IONIZED CALCIUM    Collection Time: 12/25/22  1:08 PM  Result  Value  Ref Range    Calcium, ionized  1.22  1.12 - 1.32 mmol/L  CBC WITH AUTOMATED DIFF    Collection Time: 12/25/22  1:09 PM  Result  Value  Ref Range    WBC  7.0  3.6 - 11.0 K/uL    RBC  4.77  3.80 - 5.20 M/uL    HGB  14.8  11.5 - 16.0 g/dL    HCT  44.7  35.0 - 47.0 %    MCV  93.7  80.0 - 99.0 FL    MCH  31.0  26.0 - 34.0 PG    MCHC  33.1  30.0 - 36.5 g/dL    RDW  12.4  11.5 - 14.5 %    PLATELET  883  032 - 400 K/uL    MPV  10.4  8.9 - 12.9 FL    NRBC  0.0  0  WBC    ABSOLUTE NRBC  0.00  0.00 - 0.01 K/uL    NEUTROPHILS  51  32 - 75 %    LYMPHOCYTES  33  12 - 49 %    MONOCYTES  13  5 - 13 %    EOSINOPHILS  3  0 - 7 %    BASOPHILS  0  0 - 1 %    IMMATURE GRANULOCYTES  0  0.0 - 0.5 %    ABS. NEUTROPHILS  3.5  1.8 - 8.0 K/UL    ABS. LYMPHOCYTES  2.3  0.8 - 3.5 K/UL    ABS. MONOCYTES  0.9  0.0 - 1.0 K/UL    ABS. EOSINOPHILS  0.2  0.0 - 0.4 K/UL    ABS. BASOPHILS  0.0  0.0 - 0.1 K/UL    ABS. IMM.  GRANS.  0.0  0.00 - 0.04 K/UL    DF  AUTOMATED     METABOLIC PANEL, COMPREHENSIVE    Collection Time: 12/25/22  1:09 PM  Result  Value  Ref Range    Sodium  139  136 - 145 mmol/L    Potassium  4.1  3.5 - 5.1 mmol/L    Chloride  108  97 - 108 mmol/L    CO2  27  21 - 32 mmol/L    Anion gap  4 (L)  5 - 15 mmol/L    Glucose  90  65 - 100 mg/dL    BUN  24 (H)  6 - 20 MG/DL    Creatinine  0.93  0.55 - 1.02 MG/DL    BUN/Creatinine ratio  26 (H)  12 - 20      eGFR  >60  >60 ml/min/1.73m2    Calcium  8.8  8.5 - 10.1 MG/DL    Bilirubin, total  0.3  0.2 - 1.0 MG/DL    ALT (SGPT)  19  12 - 78 U/L    AST (SGOT)  14 (L)  15 - 37 U/L    Alk. phosphatase  83  45 - 117 U/L    Protein, total  7.5  6.4 - 8.2 g/dL    Albumin  3.5  3.5 - 5.0 g/dL    Globulin  4.0  2.0 - 4.0 g/dL    A-G Ratio  0.9 (L)  1.1 - 2.2        Radiologic Studies -   CT HEAD WO CONT  Final Result  No acute intracranial abnormality. CTA HEAD NECK W CONT  Final Result    1. No large vessel occlusion. No significant vascular disease. No evidence of  vascular injury. 2.  Degenerative changes in the cervical spine most pronounced at C5-6 with  facet arthrosis. CT Results  (Last 48 hours)    None      CXR Results  (Last 48 hours)    None        Medical Decision Making  I am the first provider for this patient. I reviewed the vital signs, available nursing notes, past medical history, past surgical history, family history and social history. Vital Signs-Reviewed the patient's vital signs. Patient Vitals for the past 12 hrs:  Temp  Pulse  Resp  BP  SpO2  12/25/22 1206  97.2 °F (36.2 °C)  71  16  (!) 183/98  100 %        Records Reviewed: Nursing Notes and Old Medical Records    Provider Notes (Medical Decision Making):   Dissection, spasm, tension headache, intracranial hemorrhage,    ED Course:   Initial assessment performed. The patients presenting problems have been discussed, and they are in agreement with the care plan formulated and outlined with them.   I have encouraged them to ask questions as they arise throughout their visit. Note:    The patient is feeling better. Her results were reviewed. She is advised to follow-up and return to ER if worse           Critical Care Time:   0      Disposition:  home    DISCHARGE PLAN:  1. Discharge Medication List as of 12/25/2022  3:50 PM        START taking these medications    Details   oxyCODONE-acetaminophen (Percocet) 5-325 mg per tablet Take 1 Tablet by mouth every eight (8) hours as needed for Pain for up to 3 days. Max Daily Amount: 3 Tablets. Indications: pain, Normal, Disp-10 Tablet, R-0           CONTINUE these medications which have NOT CHANGED    Details   diclofenac potassium (CATAFLAM) 50 mg tablet Take 1 Tablet by mouth two (2) times daily as needed for Pain., Print, Disp-14 Tablet, R-0      methocarbamoL (ROBAXIN) 750 mg tablet Take 1 Tablet by mouth three (3) times daily as needed for Muscle Spasm(s). , Print, Disp-21 Tablet, R-0           2. Follow-up Information       Follow up With Specialties Details Why Contact Info    Vahid Nicole MD Neurology  As needed [address] [place] [address]  [address] [phone]      [facility] EMERGENCY DEPT Emergency Medicine  If symptoms worsen [address]  [phone]          3. Return to ED if worse     Diagnosis    Clinical Impression:   1. Acute intractable tension-type headache    2. Neck pain, acute    3. Cervical arthritis        Attestations:    Sheron Holstein, MD        Please note that this dictation was completed with [employer], the computer voice recognition software. Quite often unanticipated grammatical, syntax, homophones, and other interpretive errors are inadvertently transcribed by the computer software. Please disregard these errors. Please excuse any errors that have escaped final proofreading. Thank you.

## 2022-12-25 NOTE — ED NOTES
I have reviewed discharge instructions with the patient. The patient verbalized understanding. All questions answered at this time.

## 2022-12-27 LAB
CA-I BLD-MCNC: 1.22 MMOL/L (ref 1.12–1.32)
CHLORIDE BLD-SCNC: 106 MMOL/L (ref 98–107)
CREAT BLD-MCNC: 0.82 MG/DL (ref 0.6–1.3)
POTASSIUM BLD-SCNC: 4.2 MMOL/L (ref 3.5–5.1)
SODIUM BLD-SCNC: 140 MMOL/L (ref 136–145)

## 2023-01-04 ENCOUNTER — HOSPITAL ENCOUNTER (OUTPATIENT)
Dept: PET IMAGING | Age: 78
Discharge: HOME | End: 2023-01-04
Attending: INTERNAL MEDICINE
Payer: MEDICARE

## 2023-01-04 VITALS — WEIGHT: 166 LBS | HEIGHT: 65 IN | BODY MASS INDEX: 27.66 KG/M2

## 2023-01-04 DIAGNOSIS — R91.8 PULMONARY NODULES: ICD-10-CM

## 2023-01-04 LAB
GLUCOSE BLD STRIP.AUTO-MCNC: 97 MG/DL (ref 65–117)
SERVICE CMNT-IMP: NORMAL

## 2023-01-04 PROCEDURE — A9552 F18 FDG: HCPCS

## 2023-01-04 RX ORDER — FLUDEOXYGLUCOSE F-18 200 MCI/ML
10 INJECTION INTRAVENOUS ONCE
Status: COMPLETED | OUTPATIENT
Start: 2023-01-04 | End: 2023-01-04

## 2023-01-04 RX ADMIN — FLUDEOXYGLUCOSE F-18 9.92 MILLICURIE: 200 INJECTION INTRAVENOUS at 11:46

## 2023-01-05 ENCOUNTER — TRANSCRIBE ORDER (OUTPATIENT)
Dept: SCHEDULING | Age: 78
End: 2023-01-05

## 2023-01-05 DIAGNOSIS — R91.8 PULMONARY NODULES: Primary | ICD-10-CM

## 2023-04-22 DIAGNOSIS — R91.8 PULMONARY NODULES: Primary | ICD-10-CM

## 2023-12-08 ENCOUNTER — HOSPITAL ENCOUNTER (OUTPATIENT)
Facility: HOSPITAL | Age: 78
End: 2023-12-08
Attending: INTERNAL MEDICINE
Payer: MEDICARE

## 2023-12-08 DIAGNOSIS — R91.8 PULMONARY NODULES: ICD-10-CM

## 2023-12-08 PROCEDURE — 71250 CT THORAX DX C-: CPT

## 2024-02-27 ENCOUNTER — TELEPHONE (OUTPATIENT)
Age: 79
End: 2024-02-27

## 2024-02-29 ENCOUNTER — PROCEDURE VISIT (OUTPATIENT)
Age: 79
End: 2024-02-29

## 2024-02-29 DIAGNOSIS — M54.2 NECK PAIN: ICD-10-CM

## 2024-02-29 DIAGNOSIS — R20.2 NUMBNESS AND TINGLING OF BOTH LOWER EXTREMITIES: ICD-10-CM

## 2024-02-29 DIAGNOSIS — G56.03 CARPAL TUNNEL SYNDROME, BILATERAL: Primary | ICD-10-CM

## 2024-02-29 DIAGNOSIS — M54.50 LUMBAR PAIN: ICD-10-CM

## 2024-02-29 DIAGNOSIS — R20.0 NUMBNESS AND TINGLING OF BOTH LOWER EXTREMITIES: ICD-10-CM

## 2024-03-11 ENCOUNTER — HOSPITAL ENCOUNTER (OUTPATIENT)
Facility: HOSPITAL | Age: 79
Discharge: HOME | End: 2024-03-14
Attending: SPECIALIST
Payer: MEDICARE

## 2024-03-11 DIAGNOSIS — R20.0 NUMBNESS AND TINGLING: ICD-10-CM

## 2024-03-11 DIAGNOSIS — R20.2 NUMBNESS AND TINGLING: ICD-10-CM

## 2024-03-11 PROCEDURE — 72141 MRI NECK SPINE W/O DYE: CPT

## 2024-11-13 ENCOUNTER — TRANSCRIBE ORDERS (OUTPATIENT)
Facility: HOSPITAL | Age: 79
End: 2024-11-13

## 2024-11-13 DIAGNOSIS — I10 ESSENTIAL HYPERTENSION, MALIGNANT: Primary | ICD-10-CM

## 2025-01-31 ENCOUNTER — HOSPITAL ENCOUNTER (OUTPATIENT)
Facility: HOSPITAL | Age: 80
Discharge: HOME | End: 2025-01-31

## 2025-01-31 DIAGNOSIS — I10 ESSENTIAL HYPERTENSION, MALIGNANT: ICD-10-CM

## 2025-01-31 PROCEDURE — 75571 CT HRT W/O DYE W/CA TEST: CPT

## 2025-02-05 ENCOUNTER — TRANSCRIBE ORDERS (OUTPATIENT)
Dept: ADMINISTRATIVE | Age: 80
End: 2025-02-05

## 2025-02-05 DIAGNOSIS — I25.84 CALCIFICATION OF NATIVE CORONARY ARTERY: Primary | ICD-10-CM

## 2025-02-05 DIAGNOSIS — I25.84 CORONARY ATHEROSCLEROSIS DUE TO SEVERELY CALCIFIED CORONARY LESION: ICD-10-CM

## 2025-02-05 DIAGNOSIS — I25.10 CALCIFICATION OF NATIVE CORONARY ARTERY: Primary | ICD-10-CM

## 2025-02-10 ENCOUNTER — OFFICE VISIT (OUTPATIENT)
Age: 80
End: 2025-02-10
Payer: MEDICARE

## 2025-02-10 VITALS
TEMPERATURE: 97.3 F | BODY MASS INDEX: 26.16 KG/M2 | HEART RATE: 84 BPM | RESPIRATION RATE: 18 BRPM | DIASTOLIC BLOOD PRESSURE: 80 MMHG | SYSTOLIC BLOOD PRESSURE: 150 MMHG | HEIGHT: 65 IN | OXYGEN SATURATION: 99 % | WEIGHT: 157 LBS

## 2025-02-10 DIAGNOSIS — E55.9 VITAMIN D DEFICIENCY: ICD-10-CM

## 2025-02-10 DIAGNOSIS — Z51.81 THERAPEUTIC DRUG MONITORING: ICD-10-CM

## 2025-02-10 DIAGNOSIS — M47.22 CERVICAL RADICULOPATHY DUE TO DEGENERATIVE JOINT DISEASE OF SPINE: ICD-10-CM

## 2025-02-10 DIAGNOSIS — D89.89 IMMUNE-MEDIATED NEUROPATHY (HCC): ICD-10-CM

## 2025-02-10 DIAGNOSIS — M47.27 LUMBOSACRAL RADICULOPATHY DUE TO DEGENERATIVE JOINT DISEASE OF SPINE: ICD-10-CM

## 2025-02-10 DIAGNOSIS — G63 IMMUNE-MEDIATED NEUROPATHY (HCC): ICD-10-CM

## 2025-02-10 DIAGNOSIS — D89.89 IMMUNE-MEDIATED NEUROPATHY: ICD-10-CM

## 2025-02-10 DIAGNOSIS — M50.122 CERVICAL DISC DISORDER AT C5-C6 LEVEL WITH RADICULOPATHY: ICD-10-CM

## 2025-02-10 DIAGNOSIS — G56.03 BILATERAL CARPAL TUNNEL SYNDROME: ICD-10-CM

## 2025-02-10 DIAGNOSIS — R73.09 ABNORMAL SERUM GLUCOSE LEVEL: ICD-10-CM

## 2025-02-10 DIAGNOSIS — G60.8 IDIOPATHIC SMALL AND LARGE FIBER SENSORY NEUROPATHY: ICD-10-CM

## 2025-02-10 DIAGNOSIS — G63 IMMUNE-MEDIATED NEUROPATHY: ICD-10-CM

## 2025-02-10 DIAGNOSIS — M47.22 CERVICAL RADICULOPATHY DUE TO DEGENERATIVE JOINT DISEASE OF SPINE: Primary | ICD-10-CM

## 2025-02-10 PROCEDURE — G8419 CALC BMI OUT NRM PARAM NOF/U: HCPCS | Performed by: PSYCHIATRY & NEUROLOGY

## 2025-02-10 PROCEDURE — 1160F RVW MEDS BY RX/DR IN RCRD: CPT | Performed by: PSYCHIATRY & NEUROLOGY

## 2025-02-10 PROCEDURE — 1125F AMNT PAIN NOTED PAIN PRSNT: CPT | Performed by: PSYCHIATRY & NEUROLOGY

## 2025-02-10 PROCEDURE — 1036F TOBACCO NON-USER: CPT | Performed by: PSYCHIATRY & NEUROLOGY

## 2025-02-10 PROCEDURE — G8427 DOCREV CUR MEDS BY ELIG CLIN: HCPCS | Performed by: PSYCHIATRY & NEUROLOGY

## 2025-02-10 PROCEDURE — G8400 PT W/DXA NO RESULTS DOC: HCPCS | Performed by: PSYCHIATRY & NEUROLOGY

## 2025-02-10 PROCEDURE — 1159F MED LIST DOCD IN RCRD: CPT | Performed by: PSYCHIATRY & NEUROLOGY

## 2025-02-10 PROCEDURE — 99215 OFFICE O/P EST HI 40 MIN: CPT | Performed by: PSYCHIATRY & NEUROLOGY

## 2025-02-10 PROCEDURE — 1123F ACP DISCUSS/DSCN MKR DOCD: CPT | Performed by: PSYCHIATRY & NEUROLOGY

## 2025-02-10 PROCEDURE — 1090F PRES/ABSN URINE INCON ASSESS: CPT | Performed by: PSYCHIATRY & NEUROLOGY

## 2025-02-10 RX ORDER — AMLODIPINE BESYLATE 2.5 MG/1
1 TABLET ORAL DAILY
COMMUNITY

## 2025-02-10 ASSESSMENT — PATIENT HEALTH QUESTIONNAIRE - PHQ9
SUM OF ALL RESPONSES TO PHQ9 QUESTIONS 1 & 2: 0
1. LITTLE INTEREST OR PLEASURE IN DOING THINGS: NOT AT ALL
SUM OF ALL RESPONSES TO PHQ QUESTIONS 1-9: 0
2. FEELING DOWN, DEPRESSED OR HOPELESS: NOT AT ALL
SUM OF ALL RESPONSES TO PHQ QUESTIONS 1-9: 0

## 2025-02-10 NOTE — PROGRESS NOTES
the manipulation.  Apparently she saw a neurosurgeon Dr. Herron in 2024 and had an MRI done of her cervical spine that showed a disc bulging with effacement of the spinal cord at C5-6 and had an EMG study done by Dr. Munoz that showed severe carpal tunnel syndrome bilaterally left greater than right but no evidence of cervical radiculopathy and no evidence of a polyneuropathy on the basis of that EMG.  She has come back to be reevaluated because she is convinced this is all from her neck manipulation 2 and half years ago.  Patient will get a neuropathy workup to rule out causes of her neuropathy, and multiple metabolic panels were sent off the patient agrees with plans, and patient will get repeat EMGs and MRIs of her cervical and lumbar spine to evaluate for lumbar and cervical radiculopathies which she is convinced that she has, and can visit the manipulation by the chiropractor because of her problems, we did suggest that there was a chance that could have aggravated her condition by the double crush syndrome, causing perhaps a slight disc protrusion at C5-6 on top of her bilateral carpal tunnel syndromes, but she clearly has carpal tunnel syndromes that need decompression and we urged her to take that into consideration so she does not get further atrophy and loss of use of her hands from the numbness in median nerve compression.  She may need a referral back to neurosurgery if she has any significant progressive cervical disease.  For her headaches we will wait on MRI scans in view of her normal exam and evaluate her neck and back for and if that persist then reevaluate those.  48 minutes spent reviewing all her records reviewing all her test, reviewing her CTAs and CTs and EMGs and neurology notes and neurosurgery notes and medical notes and MRI scans all as above.  Follow-up in 3 months, earlier as need be.    Signed By: Zeb Zuniga MD     February 10, 2025         CC: Charito Rivers MD  FAX:

## 2025-02-11 LAB — 25(OH)D3 SERPL-MCNC: 15.6 NG/ML (ref 30–100)

## 2025-02-12 LAB
ERYTHROCYTE [SEDIMENTATION RATE] IN BLOOD: 12 MM/HR (ref 0–30)
EST. AVERAGE GLUCOSE BLD GHB EST-MCNC: 108 MG/DL
FOLATE SERPL-MCNC: 24.7 NG/ML (ref 5–21)
HBA1C MFR BLD: 5.4 % (ref 4–5.6)
VIT B12 SERPL-MCNC: 394 PG/ML (ref 193–986)

## 2025-02-13 LAB
CENTROMERE B AB SER-ACNC: <0.2 AI (ref 0–0.9)
CHROMATIN AB SERPL-ACNC: <0.2 AI (ref 0–0.9)
DSDNA AB SER-ACNC: 1 IU/ML (ref 0–9)
ENA JO1 AB SER-ACNC: <0.2 AI (ref 0–0.9)
ENA RNP AB SER-ACNC: <0.2 AI (ref 0–0.9)
ENA SCL70 AB SER-ACNC: <0.2 AI (ref 0–0.9)
ENA SM AB SER-ACNC: <0.2 AI (ref 0–0.9)
ENA SM+RNP AB SER-ACNC: <0.2 AI (ref 0–0.9)
ENA SS-A AB SER-ACNC: <0.2 AI (ref 0–0.9)
ENA SS-B AB SER-ACNC: <0.2 AI (ref 0–0.9)
INTERPRETATION: NORMAL
KAPPA LC FREE SER-MCNC: 35.9 MG/L (ref 3.3–19.4)
KAPPA LC FREE/LAMBDA FREE SER: 2.19 (ref 0.26–1.65)
LAMBDA LC FREE SERPL-MCNC: 16.4 MG/L (ref 5.7–26.3)
MAG (MYELIN ASSOC. GLYCOPROTEIN) AB IGM: <900 BTU (ref 0–999)
RIBOSOMAL P AB SER-ACNC: <0.2 AI (ref 0–0.9)
SEE BELOW:, 164879: NORMAL

## 2025-02-14 ENCOUNTER — TELEPHONE (OUTPATIENT)
Age: 80
End: 2025-02-14

## 2025-02-14 DIAGNOSIS — G63 IMMUNE-MEDIATED NEUROPATHY (HCC): Primary | ICD-10-CM

## 2025-02-14 DIAGNOSIS — D89.89 IMMUNE-MEDIATED NEUROPATHY (HCC): Primary | ICD-10-CM

## 2025-02-14 LAB
AMPAR1 AB SER QL IF: NEGATIVE
AMPAR2 AB SER QL IF: NEGATIVE
AMPHIPHYSIN AB SER QL: NEGATIVE
AQP4 H2O CHANNEL AB SERPL IA-ACNC: NEGATIVE
CASPR2 IGG SERPL QL IF: NEGATIVE
CV2 IGG SER-ACNC: NEGATIVE
DPPX IGG SERPL QL IF: NEGATIVE
GABABR AB SER QL IF: NEGATIVE
GAD65 IGG+IGM SER IA-SCNC: NEGATIVE NMOL/L
GLIAL NUC TYPE 1 AB SER QL IF: NEGATIVE
HU AB SER QL IF: NEGATIVE
HU2 AB SER QL IF: NEGATIVE
HU3 AB SER QL: NEGATIVE
IGLON5 IGG SER QL IF: NEGATIVE
IMP & REVIEW OF LAB RESULTS: NEGATIVE
IP3R 1 IGG SER QL IF: NEGATIVE
LGI1 IGG SERPL QL IF: NEGATIVE
MA+TA AB SER QL: NEGATIVE
MGLUR1 IGG SER QL IF: NEGATIVE
NMDAR1 AB SER QL IF: NEGATIVE
PCA-1 AB SER QL IF: NEGATIVE
PCA-2 AB SER QL IF: NEGATIVE
PCA-TR AB SER QL IF: NEGATIVE
PCA-TR AB SER QL IF: NEGATIVE
VGCC AB SER-SCNC: 13.7 PMOL/L (ref 0–30)
ZIC4 AB SER QL: NEGATIVE

## 2025-02-14 NOTE — TELEPHONE ENCOUNTER
Patient calling to say she was unable to have all labs done. She states the lab told her to call our office to let us know and that they would notify us as well. Thank you.

## 2025-02-16 LAB
ALBUMIN SERPL ELPH-MCNC: 3.9 G/DL (ref 2.9–4.4)
ALBUMIN/GLOB SERPL: 1.3 (ref 0.7–1.7)
ALPHA1 GLOB SERPL ELPH-MCNC: 0.2 G/DL (ref 0–0.4)
ALPHA2 GLOB SERPL ELPH-MCNC: 0.7 G/DL (ref 0.4–1)
B-GLOBULIN SERPL ELPH-MCNC: 1.1 G/DL (ref 0.7–1.3)
GAMMA GLOB SERPL ELPH-MCNC: 1.1 G/DL (ref 0.4–1.8)
GLOBULIN SER-MCNC: 3.2 G/DL (ref 2.2–3.9)
IGA SERPL-MCNC: 343 MG/DL (ref 64–422)
IGG SERPL-MCNC: 1078 MG/DL (ref 586–1602)
IGM SERPL-MCNC: 158 MG/DL (ref 26–217)
INTERPRETATION SERPL IEP-IMP: NORMAL
M PROTEIN SERPL ELPH-MCNC: NORMAL G/DL
PROT SERPL-MCNC: 7.1 G/DL (ref 6–8.5)

## 2025-02-18 DIAGNOSIS — D89.89 IMMUNE-MEDIATED NEUROPATHY: ICD-10-CM

## 2025-02-18 DIAGNOSIS — G63 IMMUNE-MEDIATED NEUROPATHY: ICD-10-CM

## 2025-02-25 LAB
GM 1 IGG: <20 % (ref 0–30)
INTERPRETATION: NORMAL

## 2025-02-26 ENCOUNTER — PROCEDURE VISIT (OUTPATIENT)
Age: 80
End: 2025-02-26

## 2025-02-26 DIAGNOSIS — G56.03 BILATERAL CARPAL TUNNEL SYNDROME: ICD-10-CM

## 2025-02-26 DIAGNOSIS — M50.122 CERVICAL DISC DISORDER AT C5-C6 LEVEL WITH RADICULOPATHY: ICD-10-CM

## 2025-02-26 DIAGNOSIS — M47.27 LUMBOSACRAL RADICULOPATHY DUE TO DEGENERATIVE JOINT DISEASE OF SPINE: ICD-10-CM

## 2025-02-26 DIAGNOSIS — G63 IMMUNE-MEDIATED NEUROPATHY: Primary | ICD-10-CM

## 2025-02-26 DIAGNOSIS — D89.89 IMMUNE-MEDIATED NEUROPATHY: Primary | ICD-10-CM

## 2025-02-26 DIAGNOSIS — G60.8 IDIOPATHIC SMALL AND LARGE FIBER SENSORY NEUROPATHY: ICD-10-CM

## 2025-02-26 NOTE — PROCEDURES
PROCEDURE NOTE  Date: 2025   Name: Telma Bautista  YOB: 1945    Procedures        ELECTRODIANOSTIC REPORT  Test Date:  2025    Patient: Telma Bautista : 1945 Physician: Zeb Zuniga MD   Sex: Female Tech: DOTTIE ChuaNCT  Ref Phys:      Technician: Cat Soni    Clinical Indication: Patient with complaints of numbness of both hands, and previous neck injury, EMG for possible carpal tunnel syndrome versus cervical radiculopathy.  Patient also has some back pain, and some numbness and tingling in her feet, EMG to rule out neuropathy.    Neuro exam: Patient has hypoactive but symmetric reflexes throughout and no Babinski or clonus present.  Patient has normal sensation to pin temperature and vibration in all extremities.  Patient has normal muscle bulk and tone in all extremities.  Patient has Tinel's over both median nerves at the wrist.  Patient has normal cranial nerves II through XII and normal mental status and normal gait and station.      Impression: This study shows electrophysiologic evidence of moderately severe distal median nerve entrapment at the wrists, consistent with bilateral carpal tunnel syndromes without evidence of denervation changes seen, but with no obtainable median distal sensory latencies and moderate prolongation of the distal motor latencies in both upper extremities.  There was no clear abnormality noted in the right lower extremity, and no neuropathy or lumbar radiculopathy.  There was no clear cervical radiculopathy or lumbar radiculopathy present.  Clinical correlation recommended and repeat studies in 6 to 12 months time may be of further diagnostic benefit if clinic indicated.            EMG & NCV Findings:  Evaluation of the left median motor and the right median motor nerves showed prolonged distal onset latency (L8.6, R7.9 ms) and reduced amplitude (L4.0, R3.4 mV).  The left median sensory and the right median sensory nerves showed no

## 2025-02-27 NOTE — PROGRESS NOTES
Patient is EMG showed moderate to severe carpal tunnel syndrome, with some progression on the right, and patient still somewhat reluctant to have surgery because she is worried about cervical radiculopathy but there was no evidence of that on exam today, and no evidence of lumbar radiculopathy or neuropathy in the feet.  She did have a voltage-gated calcium channel titer that was slightly elevated so that will be repeated but that does not seem to have any bearing on her current case.

## 2025-02-28 DIAGNOSIS — D89.89 IMMUNE-MEDIATED NEUROPATHY: ICD-10-CM

## 2025-02-28 DIAGNOSIS — G63 IMMUNE-MEDIATED NEUROPATHY: ICD-10-CM

## 2025-03-11 LAB — VGCC AB SER-SCNC: <1 PMOL/L (ref 0–30)

## 2025-03-14 ENCOUNTER — TELEPHONE (OUTPATIENT)
Age: 80
End: 2025-03-14

## 2025-03-14 NOTE — TELEPHONE ENCOUNTER
HIPAA Verified (if caller is someone other than patient): no       Reason for Call:     Name of orthopedic doctor.Xray of neck    Details from Caller:  Patient states that during her last visit with Dr. Zuniga, he had mention an orthopedic doctor for her hand. She would like to to get that information. Pt would also like to have an order for a xray of her neck and spinal cord      Message: (any additional details from patient/caller not covered above)  She would like to recv this information through [employer]

## 2025-03-18 DIAGNOSIS — G56.03 BILATERAL CARPAL TUNNEL SYNDROME: Primary | ICD-10-CM

## 2025-03-19 ENCOUNTER — HOSPITAL ENCOUNTER (OUTPATIENT)
Facility: HOSPITAL | Age: 80
Discharge: HOME | End: 2025-03-22
Payer: MEDICARE

## 2025-03-19 VITALS
SYSTOLIC BLOOD PRESSURE: 147 MMHG | HEART RATE: 59 BPM | OXYGEN SATURATION: 94 % | DIASTOLIC BLOOD PRESSURE: 74 MMHG | RESPIRATION RATE: 20 BRPM

## 2025-03-19 DIAGNOSIS — I25.84 CORONARY ATHEROSCLEROSIS DUE TO SEVERELY CALCIFIED CORONARY LESION: ICD-10-CM

## 2025-03-19 PROCEDURE — 6360000004 HC RX CONTRAST MEDICATION: Performed by: INTERNAL MEDICINE

## 2025-03-19 PROCEDURE — 75574 CT ANGIO HRT W/3D IMAGE: CPT

## 2025-03-19 PROCEDURE — 6370000000 HC RX 637 (ALT 250 FOR IP): Performed by: INTERNAL MEDICINE

## 2025-03-19 RX ORDER — IOPAMIDOL 755 MG/ML
100 INJECTION, SOLUTION INTRAVASCULAR
Status: COMPLETED | OUTPATIENT
Start: 2025-03-19 | End: 2025-03-19

## 2025-03-19 RX ORDER — NITROGLYCERIN 0.4 MG/1
0.8 TABLET SUBLINGUAL PRN
Status: DISCONTINUED | OUTPATIENT
Start: 2025-03-19 | End: 2025-03-23 | Stop reason: HOSPADM

## 2025-03-19 RX ADMIN — IOPAMIDOL 100 ML: 755 INJECTION, SOLUTION INTRAVENOUS at 11:35

## 2025-03-19 RX ADMIN — NITROGLYCERIN 0.8 MG: 0.4 TABLET SUBLINGUAL at 11:25

## 2025-03-19 NOTE — PROGRESS NOTES
I called the patient, explained to her that she already had the MRI scan of the cervical spine 1 year ago and it just shows mild arthritis, and no other structural lesion of the neck, and we will refer her to Dr. Palomares if she requested for her carpal tunnel syndrome.

## 2025-03-19 NOTE — DISCHARGE INSTRUCTIONS
[facility]  Department of Interventional Radiology  [address] [place] [address] [phone]    CARDIAC CTA DISCHARGE EDUCATION    Information:   You had a Cardiac CTA scan today, where you received medication to lower your heart rate and dilate the blood vessel around your heart. During the scan you also received IV contrast.     What should I expect after the Cardiac CTA?   The medicine may make you feel dizzy because it lowers blood pressure rapidly. You may have been given IV Metoprolol and Nitroglycerin tabs.    Precautions  Limit the amount of alcohol you drink. Too much alcohol can cause dizziness or fainting.  Don't take phosphodiesterase inhibitors for next 48 hours, such as sildenafil (Viagra) or tadalafil (Cialis) at any time if you are on nitroglycerin treatment. These are medicines used to treat sexual dysfunction in men. The combination of nitroglycerin with these medicines can cause a severe drop in blood pressure. This can lead to dizziness, fainting, heart attack, or stroke.  Possible side effects of nitroglycerin. Mild side effects include:  Headache  Dizziness or lightheadedness  Mouth tingling or burning  Edema  Abdominal pain  When to call your healthcare provider   Call your healthcare provider right away if any of the following occur:  Signs of an allergic reaction, such as trouble breathing, tightness in the chest or throat, wheezing, rash, hives, swelling of the mouth, face, or throat.  Severe headache  Severe dizziness, or fainting  Nausea or vomiting  Fast heartbeat (higher than 100 beats per minute), slow heartbeat, or irregular heartbeat  Flushing (redness of the face, neck, or chest)  Blurred eyesight  Dry mouth  Sweating a lot  Pale skin  Restlessness  Swollen ankles  Weakness or tiredness      Follow-up visit information:    Follow up with ordering Physician for result.      If you have any questions or concerns, please call

## 2025-03-21 ENCOUNTER — PATIENT MESSAGE (OUTPATIENT)
Age: 80
End: 2025-03-21

## 2025-03-24 ENCOUNTER — HOSPITAL ENCOUNTER (OUTPATIENT)
Facility: HOSPITAL | Age: 80
Discharge: HOME | End: 2025-03-27
Payer: MEDICARE

## 2025-03-24 DIAGNOSIS — I25.84 CORONARY ATHEROSCLEROSIS DUE TO SEVERELY CALCIFIED CORONARY LESION: ICD-10-CM

## 2025-03-24 PROCEDURE — 75580 N-INVAS EST C FFR SW ALY CTA: CPT

## 2025-04-04 DIAGNOSIS — M47.27 LUMBOSACRAL RADICULOPATHY DUE TO DEGENERATIVE JOINT DISEASE OF SPINE: Primary | ICD-10-CM

## 2025-04-04 DIAGNOSIS — M48.061 DEGENERATIVE LUMBAR SPINAL STENOSIS: ICD-10-CM

## 2025-04-23 ENCOUNTER — HOSPITAL ENCOUNTER (OUTPATIENT)
Facility: HOSPITAL | Age: 80
Discharge: HOME | End: 2025-04-26
Attending: PSYCHIATRY & NEUROLOGY
Payer: MEDICARE

## 2025-04-23 DIAGNOSIS — M48.061 DEGENERATIVE LUMBAR SPINAL STENOSIS: ICD-10-CM

## 2025-04-23 DIAGNOSIS — M47.27 LUMBOSACRAL RADICULOPATHY DUE TO DEGENERATIVE JOINT DISEASE OF SPINE: ICD-10-CM

## 2025-04-23 PROCEDURE — 72148 MRI LUMBAR SPINE W/O DYE: CPT

## 2025-04-28 ENCOUNTER — CLINICAL DOCUMENTATION (OUTPATIENT)
Age: 80
End: 2025-04-28

## 2025-04-29 NOTE — PROGRESS NOTES
We advised the patient that on her lumbar spine she just has mild arthritis, and she still does not think she has carpal tunnel syndrome and her hands are better after the neck surgery so I told her we can just follow this, and see if the hands get better and maybe she had some form of double crush syndrome with the neck and the hands, but as long as she is okay she does not have to have anything done with her hands yet, we can see her in follow-up maybe repeat the EMG down the way and see what happens, and she has no neuropathy on the basis of the EMG we can follow that just to make sure she did not have small fiber neuropathy also.  And she said she will do that.

## 2025-05-22 ENCOUNTER — HOSPITAL ENCOUNTER (OUTPATIENT)
Facility: HOSPITAL | Age: 80
Discharge: HOME | End: 2025-05-23
Attending: INTERNAL MEDICINE | Admitting: INTERNAL MEDICINE
Payer: MEDICARE

## 2025-05-22 DIAGNOSIS — Z95.5 STENTED CORONARY ARTERY: Primary | ICD-10-CM

## 2025-05-22 DIAGNOSIS — I25.118 CORONARY ARTERY DISEASE INVOLVING NATIVE CORONARY ARTERY OF NATIVE HEART WITH OTHER FORM OF ANGINA PECTORIS: Primary | ICD-10-CM

## 2025-05-22 DIAGNOSIS — R06.09 DYSPNEA ON EXERTION: ICD-10-CM

## 2025-05-22 PROBLEM — I25.10 CAD (CORONARY ARTERY DISEASE): Status: ACTIVE | Noted: 2025-05-22

## 2025-05-22 LAB
ACT BLD: 245 SECS (ref 79–138)
ACT BLD: 314 SECS (ref 79–138)
ECHO BSA: 1.77 M2

## 2025-05-22 PROCEDURE — C1769 GUIDE WIRE: HCPCS | Performed by: INTERNAL MEDICINE

## 2025-05-22 PROCEDURE — 99152 MOD SED SAME PHYS/QHP 5/>YRS: CPT | Performed by: INTERNAL MEDICINE

## 2025-05-22 PROCEDURE — 6360000004 HC RX CONTRAST MEDICATION: Performed by: INTERNAL MEDICINE

## 2025-05-22 PROCEDURE — 99153 MOD SED SAME PHYS/QHP EA: CPT | Performed by: INTERNAL MEDICINE

## 2025-05-22 PROCEDURE — 2500000003 HC RX 250 WO HCPCS: Performed by: INTERNAL MEDICINE

## 2025-05-22 PROCEDURE — C1894 INTRO/SHEATH, NON-LASER: HCPCS | Performed by: INTERNAL MEDICINE

## 2025-05-22 PROCEDURE — C1887 CATHETER, GUIDING: HCPCS | Performed by: INTERNAL MEDICINE

## 2025-05-22 PROCEDURE — 93458 L HRT ARTERY/VENTRICLE ANGIO: CPT | Performed by: INTERNAL MEDICINE

## 2025-05-22 PROCEDURE — 93571 IV DOP VEL&/PRESS C FLO 1ST: CPT | Performed by: INTERNAL MEDICINE

## 2025-05-22 PROCEDURE — 6360000002 HC RX W HCPCS: Performed by: INTERNAL MEDICINE

## 2025-05-22 PROCEDURE — 6370000000 HC RX 637 (ALT 250 FOR IP): Performed by: INTERNAL MEDICINE

## 2025-05-22 PROCEDURE — C9600 PERC DRUG-EL COR STENT SING: HCPCS | Performed by: INTERNAL MEDICINE

## 2025-05-22 PROCEDURE — C1874 STENT, COATED/COV W/DEL SYS: HCPCS | Performed by: INTERNAL MEDICINE

## 2025-05-22 PROCEDURE — C1725 CATH, TRANSLUMIN NON-LASER: HCPCS | Performed by: INTERNAL MEDICINE

## 2025-05-22 PROCEDURE — 2709999900 HC NON-CHARGEABLE SUPPLY: Performed by: INTERNAL MEDICINE

## 2025-05-22 PROCEDURE — 85347 COAGULATION TIME ACTIVATED: CPT

## 2025-05-22 DEVICE — STENT ONYXNG25022UX ONYX 2.50X22RX
Type: IMPLANTABLE DEVICE | Status: FUNCTIONAL
Brand: ONYX FRONTIER™

## 2025-05-22 RX ORDER — METOPROLOL TARTRATE 25 MG/1
12.5 TABLET, FILM COATED ORAL 2 TIMES DAILY
Status: DISCONTINUED | OUTPATIENT
Start: 2025-05-22 | End: 2025-05-23 | Stop reason: HOSPADM

## 2025-05-22 RX ORDER — ACETAMINOPHEN 325 MG/1
650 TABLET ORAL EVERY 4 HOURS PRN
Status: DISCONTINUED | OUTPATIENT
Start: 2025-05-22 | End: 2025-05-23 | Stop reason: HOSPADM

## 2025-05-22 RX ORDER — FENTANYL CITRATE 50 UG/ML
INJECTION, SOLUTION INTRAMUSCULAR; INTRAVENOUS PRN
Status: DISCONTINUED | OUTPATIENT
Start: 2025-05-22 | End: 2025-05-22 | Stop reason: HOSPADM

## 2025-05-22 RX ORDER — SODIUM CHLORIDE 9 MG/ML
INJECTION, SOLUTION INTRAVENOUS PRN
Status: DISCONTINUED | OUTPATIENT
Start: 2025-05-22 | End: 2025-05-23 | Stop reason: HOSPADM

## 2025-05-22 RX ORDER — ASPIRIN 81 MG/1
81 TABLET, CHEWABLE ORAL DAILY
Status: CANCELLED | OUTPATIENT
Start: 2025-05-22

## 2025-05-22 RX ORDER — HEPARIN SODIUM 1000 [USP'U]/ML
INJECTION, SOLUTION INTRAVENOUS; SUBCUTANEOUS PRN
Status: DISCONTINUED | OUTPATIENT
Start: 2025-05-22 | End: 2025-05-22 | Stop reason: HOSPADM

## 2025-05-22 RX ORDER — CLOPIDOGREL 300 MG/1
TABLET, FILM COATED ORAL PRN
Status: DISCONTINUED | OUTPATIENT
Start: 2025-05-22 | End: 2025-05-22 | Stop reason: HOSPADM

## 2025-05-22 RX ORDER — IOPAMIDOL 755 MG/ML
INJECTION, SOLUTION INTRAVASCULAR PRN
Status: DISCONTINUED | OUTPATIENT
Start: 2025-05-22 | End: 2025-05-22 | Stop reason: HOSPADM

## 2025-05-22 RX ORDER — SODIUM CHLORIDE 0.9 % (FLUSH) 0.9 %
5-40 SYRINGE (ML) INJECTION EVERY 12 HOURS SCHEDULED
Status: DISCONTINUED | OUTPATIENT
Start: 2025-05-22 | End: 2025-05-23 | Stop reason: HOSPADM

## 2025-05-22 RX ORDER — ROSUVASTATIN CALCIUM 10 MG/1
20 TABLET, COATED ORAL NIGHTLY
Status: DISCONTINUED | OUTPATIENT
Start: 2025-05-22 | End: 2025-05-23 | Stop reason: HOSPADM

## 2025-05-22 RX ORDER — AMLODIPINE BESYLATE 2.5 MG/1
2.5 TABLET ORAL DAILY
Status: DISCONTINUED | OUTPATIENT
Start: 2025-05-22 | End: 2025-05-23 | Stop reason: HOSPADM

## 2025-05-22 RX ORDER — ASPIRIN 81 MG/1
81 TABLET, CHEWABLE ORAL DAILY
Status: DISCONTINUED | OUTPATIENT
Start: 2025-05-22 | End: 2025-05-23 | Stop reason: HOSPADM

## 2025-05-22 RX ORDER — LIDOCAINE HYDROCHLORIDE 10 MG/ML
INJECTION, SOLUTION INFILTRATION; PERINEURAL PRN
Status: DISCONTINUED | OUTPATIENT
Start: 2025-05-22 | End: 2025-05-22 | Stop reason: HOSPADM

## 2025-05-22 RX ORDER — HEPARIN SODIUM 10000 [USP'U]/ML
INJECTION, SOLUTION INTRAVENOUS; SUBCUTANEOUS PRN
Status: DISCONTINUED | OUTPATIENT
Start: 2025-05-22 | End: 2025-05-22 | Stop reason: HOSPADM

## 2025-05-22 RX ORDER — ASPIRIN 81 MG/1
81 TABLET, CHEWABLE ORAL DAILY
COMMUNITY

## 2025-05-22 RX ORDER — CLOPIDOGREL BISULFATE 75 MG/1
75 TABLET ORAL DAILY
Qty: 90 TABLET | Refills: 3 | Status: SHIPPED | OUTPATIENT
Start: 2025-05-22

## 2025-05-22 RX ORDER — VERAPAMIL HYDROCHLORIDE 2.5 MG/ML
INJECTION INTRAVENOUS PRN
Status: DISCONTINUED | OUTPATIENT
Start: 2025-05-22 | End: 2025-05-22 | Stop reason: HOSPADM

## 2025-05-22 RX ORDER — ASPIRIN 81 MG/1
81 TABLET, CHEWABLE ORAL DAILY
Status: DISCONTINUED | OUTPATIENT
Start: 2025-05-23 | End: 2025-05-22 | Stop reason: SDUPTHER

## 2025-05-22 RX ORDER — SODIUM CHLORIDE 0.9 % (FLUSH) 0.9 %
5-40 SYRINGE (ML) INJECTION PRN
Status: DISCONTINUED | OUTPATIENT
Start: 2025-05-22 | End: 2025-05-23 | Stop reason: HOSPADM

## 2025-05-22 RX ORDER — CLOPIDOGREL BISULFATE 75 MG/1
75 TABLET ORAL DAILY
Status: DISCONTINUED | OUTPATIENT
Start: 2025-05-23 | End: 2025-05-23 | Stop reason: HOSPADM

## 2025-05-22 RX ADMIN — ASPIRIN 81 MG: 81 TABLET, CHEWABLE ORAL at 08:03

## 2025-05-22 RX ADMIN — AMLODIPINE BESYLATE 2.5 MG: 5 TABLET ORAL at 16:18

## 2025-05-22 RX ADMIN — METOPROLOL TARTRATE 12.5 MG: 25 TABLET, FILM COATED ORAL at 16:06

## 2025-05-22 NOTE — PROGRESS NOTES
16:50 PM: Patient arrived to IVCU from Cath Lab Recovery. Right radial, TR off, no bleeding/hematoma. Immobilizer in place. VSS, NSR, RA, Afebrile. Patient on bedrest. Patient reporting no CP/SOB.     End of Shift Note    Bedside shift change report given to ARNEL Nance (oncoming nurse) by Mariajose Hector RN (offgoing nurse).  Report included the following information SBAR    Shift worked:  7A-7P     Shift summary and any significant changes:     Cath today, right radial, stent placed. Chest pain at 4pm, resolved. Metoprolol added.      Concerns for physician to address:  D/C AM     Zone phone for oncoming shift:   [phone]       Activity:     Number times ambulated in hallways past shift: 1  Number of times OOB to chair past shift: 2    Cardiac:   Cardiac Monitoring: Yes      Cardiac Rhythm: Sinus abhijeet    Access:  Current line(s): PIV     Genitourinary:        Respiratory:   O2 Device: None (Room air)  Chronic home O2 use?: NO  Incentive spirometer at bedside: NO    GI:     Current diet:  DIET ONE TIME MESSAGE;  ADULT DIET; Regular; Low Fat/Low Chol/High Fiber/2 gm Na  Passing flatus: YES    Pain Management:   Patient states pain is manageable on current regimen: YES    Skin:  Bret Scale Score: 23  Interventions: Wound Offloading (Prevention Methods): Repositioning    Patient Safety:  Fall Risk: Nursing Judgement-Fall Risk High(Add Comments): Yes  Fall Risk Interventions  Nursing Judgement-Fall Risk High(Add Comments): Yes  Toilet Every 2 Hours-In Advance of Need: Yes  Hourly Visual Checks: Awake, In bed  Fall Visual Posted: Socks  Room Door Open: Yes  Alarm On: Bed, Chair  Patient Moved Closer to Nursing Station: No    Active Consults:   IP CONSULT TO CARDIAC REHAB    Length of Stay:  Expected LOS: 0  Actual LOS: 0    Mariajose Hector RN

## 2025-05-22 NOTE — PROGRESS NOTES
[facility]  [address], [place] [id number] [phone]  [email or web address]  CARDIOLOGY PROGRESS NOTE    5/22/2025 4:03 PM    Admit Date: 5/22/2025    Admit Diagnosis:   Dyspnea on exertion [R06.09]    Subjective:     Telma Bautista 80 f PMh HTN , HLP, exertional dyspnea. CTA coronaries abnormal with LCX- and RCA moderate lesions at least. Her cath      1. Severe  LCX - OM1 lesion calcific. Successful PCI with DESx1. excellent angiographic results.   2. Moderate disease in RCA DFR= 0.97. hemodynamically not significant.45- 50%.   3. Mild disease in LAD.   4. LVEDP 13 MMHG. No AS.   5. She was loaded with plavix 600x1.   6. Right radial artery small  used 5f system for the entire case.       She reported some chest pain.  EKG sinus rhythm at 62 bpm septal Q waves .no acute ST-T wave changes isolated Q wave in lead III.  Her blood pressures are elevated she is not on any medication at home  BP (!) 168/67   Pulse 58   Resp 14   Ht 1.651 m (5' 5\")   Wt 68 kg (150 lb)   SpO2 97%   BMI 24.96 kg/m²     Current Facility-Administered Medications   Medication Dose Route Frequency    aspirin chewable tablet 81 mg  81 mg Oral Daily    metoprolol tartrate (LOPRESSOR) tablet 12.5 mg  12.5 mg Oral BID         Objective:      Physical Exam  General Examination: Alert awake oriented x 3.  Heart: S1-S2 regular no appreciable murmurs.  Lungs: Clear to auscultation bilaterally.    Abdomen: Soft nontender  Extremities pedal edema.      Data Review:   No results for input(s): \"WBC\", \"HGB\", \"HCT\", \"PLT\" in the last 72 hours.  No results for input(s): \"NA\", \"K\", \"CL\", \"CO2\", \"GLU\", \"BUN\", \"CREATININE\", \"MG\", \"PHOS\", \"ALT\", \"INR\" in the last 72 hours.    Invalid input(s): \"CA\", \"ALB\", \"TBIL\", \"TBILI\", \"SGOT\"    No results for input(s): \"TROPHS\" in the last 72 hours.      Intake/Output Summary (Last 24 hours) at 5/22/2025 1603  Last data filed at 5/22/2025 1028  Gross per 24

## 2025-05-22 NOTE — CARDIO/PULMONARY
Chart reviewed: Patient is 80 y.o. female admitted with Dyspnea on exertion [R06.09]    S/P [facility] on 5/22/25. Per cath report, \"Severe  LCX - OM1 lesion calcific. Successful PCI with DESx1. \" No current EF in EPIC.     Materials mailed 5/22/25.     DESHAWN MEDRANO RN

## 2025-05-22 NOTE — PROGRESS NOTES
1600: pt complaining of 5/10 substernal chest pressure. EKG obtained and MD Aguilar at bedside. /68, HR 70, O2 95 on RA.

## 2025-05-22 NOTE — PROGRESS NOTES
Cardiac Cath Lab Recovery Arrival Note:      Telma Bautista arrived to Cardiac Cath Lab, Recovery Area. Staff introduced to patient. Patient identifiers verified with NAME and DATE OF BIRTH. Procedure verified with patient. Consent forms reviewed and signed by patient or authorized representative and verified. Allergies verified.     Patient and family oriented to department. Patient and family informed of procedure and plan of care.     Questions answered with review. Patient prepped for procedure, per orders from physician, prior to arrival.    Patient on cardiac monitor, non-invasive blood pressure, SPO2 monitor. On RA. Patient is A&Ox 4. Patient reports NO PAIN.     Patient in stretcher, in low position, with side rails up, call bell within reach, patient instructed to call if assistance as needed.    Patient prep in: [facility], [address].   Patient family has pager #   Family in: .   Prep by: ANGELIQUE

## 2025-05-23 VITALS
WEIGHT: 150 LBS | BODY MASS INDEX: 24.99 KG/M2 | SYSTOLIC BLOOD PRESSURE: 147 MMHG | HEIGHT: 65 IN | TEMPERATURE: 97.9 F | HEART RATE: 60 BPM | DIASTOLIC BLOOD PRESSURE: 78 MMHG | OXYGEN SATURATION: 98 % | RESPIRATION RATE: 18 BRPM

## 2025-05-23 PROCEDURE — 6370000000 HC RX 637 (ALT 250 FOR IP): Performed by: INTERNAL MEDICINE

## 2025-05-23 RX ADMIN — AMLODIPINE BESYLATE 2.5 MG: 5 TABLET ORAL at 09:06

## 2025-05-23 RX ADMIN — CLOPIDOGREL BISULFATE 75 MG: 75 TABLET, FILM COATED ORAL at 09:05

## 2025-05-23 RX ADMIN — ASPIRIN 81 MG: 81 TABLET, CHEWABLE ORAL at 09:05

## 2025-05-23 NOTE — PROGRESS NOTES
8:41 AM Spoke with MD Aguilar regarding discharge instructions. Plan to go home on apsirin and Plavix. RN will share the importance of taking these medications every day. Medication instructions added to the AVS.     10:43 AM Pt ready for discharge. RN reviewed all new medications and where to  the prescriptions. RN reviewed how to follow up with the cardiologist. RN reviewed right radial site care instructions. RN took time to explain the benefits of the post-cath medications, specifically aspirin and plavix. RN explained the risks of not taking these medications every day. Education added to the discharge AVS. Pt verbalized understanding. RN provided time to answer any and all questions.

## 2025-05-23 NOTE — PLAN OF CARE
End of Shift Note    Bedside shift change report given to Mariajose SEALS    (oncoming nurse) by Lee Ann Stallings RN (offgoing nurse).  Report included the following information Nurse Handoff Report    Shift worked:  Night shift      Shift summary and any significant changes:     No acute changes overnight      Concerns for physician to address:  N/a     Zone phone for oncoming shift:   [phone]       Activity:  Activity: In bed  Number times ambulated in hallways past shift: 0  Number of times OOB to chair past shift: 0    Cardiac:   Cardiac Monitoring: Yes      Cardiac Rhythm: Sinus abhijeet    Access:  Current line(s): PIV     Genitourinary:   Urinary status: voiding    Respiratory:   O2 Device: None (Room air)  Chronic home O2 use?: NO  Incentive spirometer at bedside: YES       GI:     Current diet:    DIET ONE TIME MESSAGE;  ADULT DIET; Regular; Low Fat/Low Chol/High Fiber/2 gm Na  Passing flatus: YES  Tolerating current diet: YES       Pain Management:   Patient states pain is manageable on current regimen: YES    Skin:  Bret Scale Score: 23  Interventions: increase time out of bed    Patient Safety:  Fall Score: Thomas Total Score: 20  Interventions: bed/chair alarm, assistive device (walker, cane. etc), gripper socks, and pt to call before getting OOB       Length of Stay:  Expected LOS: 0  Actual LOS: 0      Lee Ann Stallings RN  Predictive Model Details          21 (Normal)  Factor Value    Calculated 5/22/2025 21:59 69% Age 80 years old    Deterioration Index Model 22% Cardiac rhythm Sinus abhijeet     4% Systolic 106     4% Pulse 57     0% Temperature 98 °F (36.7 °C)     0% Pulse oximetry 96 %     0% Respiratory rate 16        Problem: Discharge Planning  Goal: Discharge to home or other facility with appropriate resources  Outcome: Progressing  Flowsheets  Taken 5/22/2025 2000 by Lee Ann Stallings, RN  Discharge to home or other facility with appropriate resources: Identify barriers to discharge with patient and

## 2025-05-23 NOTE — PLAN OF CARE
Predictive Model Details          23 (Normal)  Factor Value    Calculated 5/23/2025 08:25 58% Age 80 years old    Deterioration Index Model 18% Cardiac rhythm Sinus abhijeet     11% Respiratory rate 18     10% Systolic 147     2% Pulse 60     1% Pulse oximetry 98 %     0% Temperature 97.9 °F (36.6 °C)        Problem: Discharge Planning  Goal: Discharge to home or other facility with appropriate resources  5/23/2025 0825 by Mariajose Hector RN  Outcome: Progressing  Flowsheets (Taken 5/23/2025 0817)  Discharge to home or other facility with appropriate resources: Identify barriers to discharge with patient and caregiver  5/22/2025 2159 by Lee Ann Stallings RN  Outcome: Progressing  Flowsheets  Taken 5/22/2025 2000 by Lee Ann Stallings RN  Discharge to home or other facility with appropriate resources: Identify barriers to discharge with patient and caregiver  Taken 5/22/2025 1650 by Mariajose Hector RN  Discharge to home or other facility with appropriate resources: Identify barriers to discharge with patient and caregiver     Problem: Safety - Adult  Goal: Free from fall injury  5/23/2025 0825 by Mariajose Hector RN  Outcome: Progressing  5/22/2025 2159 by Lee Ann Stallings RN  Outcome: Progressing  Flowsheets (Taken 5/22/2025 2159)  Free From Fall Injury: Instruct family/caregiver on patient safety     Problem: ABCDS Injury Assessment  Goal: Absence of physical injury  5/23/2025 0825 by Mariajose Hector RN  Outcome: Progressing  5/22/2025 2159 by Lee Ann Stallings RN  Outcome: Progressing

## 2025-05-24 ENCOUNTER — HOSPITAL ENCOUNTER (EMERGENCY)
Facility: HOSPITAL | Age: 80
Discharge: LEFT BEFORE DISCHARGE | End: 2025-05-24
Attending: EMERGENCY MEDICINE
Payer: MEDICARE

## 2025-05-24 VITALS
RESPIRATION RATE: 16 BRPM | TEMPERATURE: 97.9 F | DIASTOLIC BLOOD PRESSURE: 66 MMHG | OXYGEN SATURATION: 98 % | SYSTOLIC BLOOD PRESSURE: 160 MMHG | HEART RATE: 81 BPM

## 2025-05-24 DIAGNOSIS — R04.0 EPISTAXIS: Primary | ICD-10-CM

## 2025-05-24 PROCEDURE — 99281 EMR DPT VST MAYX REQ PHY/QHP: CPT

## 2025-05-24 RX ORDER — OXYMETAZOLINE HYDROCHLORIDE 0.05 G/100ML
2 SPRAY NASAL
Status: DISCONTINUED | OUTPATIENT
Start: 2025-05-24 | End: 2025-05-25 | Stop reason: HOSPADM

## 2025-05-24 ASSESSMENT — PAIN SCALES - GENERAL: PAINLEVEL_OUTOF10: 0

## 2025-05-24 ASSESSMENT — LIFESTYLE VARIABLES
HOW MANY STANDARD DRINKS CONTAINING ALCOHOL DO YOU HAVE ON A TYPICAL DAY: 1 OR 2
HOW OFTEN DO YOU HAVE A DRINK CONTAINING ALCOHOL: MONTHLY OR LESS

## 2025-05-25 NOTE — ED PROVIDER NOTES
EMERGENCY DEPARTMENT HISTORY AND PHYSICAL EXAM     ----------------------------------------------------------------------------  Please note that this dictation was completed with [employer], the [employer] voice recognition software.  Quite often unanticipated grammatical, syntax, homophones, and other interpretive errors are inadvertently transcribed by the computer software.  Please disregard these errors.  Please excuse any errors that have escaped final proofreading  ----------------------------------------------------------------------------      Date: 5/24/2025  Patient Name: Telma Bautista      HISTORY OF PRESENT ILLNESS     Chief Complaint   Patient presents with    Epistaxis     Patient ambulatory to triage with c/o a nose bleed that started 1 hr PTA, bleeding has now stopped. Patient reports having a stent placed 5/22 and was placed on Clopidogrel 75 mg. Patient denies CP at this time       History obtainted from:  Patient    Other independent source of history: none    HPI: Telma Bautista is a 80 y.o. female, with significant pmhx of chronic anticoagulation, previous septal deviation repair with recurrent epistaxis subsequently recurrent epistaxis who presents via private vehicle to the ED with c/o nosebleed that occurred earlier this evening which has basically stopped by the time she was evaluated here in the ED.  Noted to have minimal oozing continued with blotting of her nose.  Denies chest pain, shortness of breath or lightheaded sensation.      PCP: Charito Rivers MD    Allergy List:   No Known Allergies      CURRENT MEDICATIONS      Previous Medications    AMLODIPINE (NORVASC) 2.5 MG TABLET    Take 1 tablet by mouth daily    ASPIRIN 81 MG CHEWABLE TABLET    Take 1 tablet by mouth daily    CLOPIDOGREL (PLAVIX) 75 MG TABLET    Take 1 tablet by mouth daily    ROSUVASTATIN CALCIUM (CRESTOR PO)    Take by mouth       PAST HISTORY       Past Medical History:  History reviewed. No pertinent past

## 2025-06-18 ENCOUNTER — OFFICE VISIT (OUTPATIENT)
Age: 80
End: 2025-06-18
Payer: MEDICARE

## 2025-06-18 VITALS
TEMPERATURE: 98 F | RESPIRATION RATE: 19 BRPM | DIASTOLIC BLOOD PRESSURE: 90 MMHG | HEART RATE: 86 BPM | SYSTOLIC BLOOD PRESSURE: 140 MMHG | BODY MASS INDEX: 24.83 KG/M2 | HEIGHT: 65 IN | WEIGHT: 149 LBS | OXYGEN SATURATION: 100 %

## 2025-06-18 DIAGNOSIS — R20.0 NUMBNESS IN FEET: ICD-10-CM

## 2025-06-18 DIAGNOSIS — M48.02 CERVICAL STENOSIS OF SPINAL CANAL: Primary | ICD-10-CM

## 2025-06-18 DIAGNOSIS — G56.03 CARPAL TUNNEL SYNDROME, BILATERAL: ICD-10-CM

## 2025-06-18 DIAGNOSIS — R26.89 BALANCE DISORDER: ICD-10-CM

## 2025-06-18 PROCEDURE — 1123F ACP DISCUSS/DSCN MKR DOCD: CPT | Performed by: NURSE PRACTITIONER

## 2025-06-18 PROCEDURE — 99215 OFFICE O/P EST HI 40 MIN: CPT | Performed by: NURSE PRACTITIONER

## 2025-06-18 PROCEDURE — G8420 CALC BMI NORM PARAMETERS: HCPCS | Performed by: NURSE PRACTITIONER

## 2025-06-18 PROCEDURE — 1126F AMNT PAIN NOTED NONE PRSNT: CPT | Performed by: NURSE PRACTITIONER

## 2025-06-18 PROCEDURE — 1090F PRES/ABSN URINE INCON ASSESS: CPT | Performed by: NURSE PRACTITIONER

## 2025-06-18 PROCEDURE — 1036F TOBACCO NON-USER: CPT | Performed by: NURSE PRACTITIONER

## 2025-06-18 PROCEDURE — 1159F MED LIST DOCD IN RCRD: CPT | Performed by: NURSE PRACTITIONER

## 2025-06-18 PROCEDURE — 1160F RVW MEDS BY RX/DR IN RCRD: CPT | Performed by: NURSE PRACTITIONER

## 2025-06-18 PROCEDURE — G8400 PT W/DXA NO RESULTS DOC: HCPCS | Performed by: NURSE PRACTITIONER

## 2025-06-18 PROCEDURE — G8427 DOCREV CUR MEDS BY ELIG CLIN: HCPCS | Performed by: NURSE PRACTITIONER

## 2025-06-18 ASSESSMENT — PATIENT HEALTH QUESTIONNAIRE - PHQ9
2. FEELING DOWN, DEPRESSED OR HOPELESS: NOT AT ALL
SUM OF ALL RESPONSES TO PHQ QUESTIONS 1-9: 0
1. LITTLE INTEREST OR PLEASURE IN DOING THINGS: NOT AT ALL
SUM OF ALL RESPONSES TO PHQ QUESTIONS 1-9: 0

## 2025-06-18 NOTE — PROGRESS NOTES
Canal is patent. Mild right and left foraminal stenosis.    L5-S1: Moderate facet arthropathy. Disc desiccation and loss of disc height.  Circumferential disc bulge. The canal is patent. Moderate bilateral foraminal  stenosis.    Impression  Multilevel disc and facet degenerative change.    There is mild canal stenosis at L3-4.    Moderate bilateral foraminal stenosis at L5-S1.    Additional, less severe degenerative findings are as described in detail above.    Electronically signed by ELIZABETH PABLO        PHYSICAL EXAMINATION:    Vitals:    06/18/25 0926 06/18/25 0930   BP: (!) 144/90 (!) 140/90   BP Site: Left Upper Arm    Patient Position: Sitting    BP Cuff Size: Medium Adult    Pulse: 86    Resp: 19    Temp: 98 °F (36.7 °C)    TempSrc: Temporal    SpO2: 100%    Weight: 67.6 kg (149 lb)    Height: 1.651 m (5' 5\")        General:  Well defined, nourished, and well groomed individual in no acute distress.    Musculoskeletal:  Extremities revealed no edema and had full range of motion of joints.  Positive Phalen's bilateral wrist.  Psych:  Good mood and bright affect.    NEUROLOGICAL EXAMINATION:     Mental Status:   Alert and oriented to person, place, and time with recent and remote memory intact.  Attention span and concentration are normal. Clear speech. Fund of knowledge preserved.      Cranial Nerves:   PERRLA.  Visual fields were full  EOM: no evidence of nystagmus  Facial sensation:  normal and symmetric  Facial motor: normal and symmetric, no facial droop noted.  Hearing intact  SCM strength intact  Tongue: midline without fasciculations    Motor Examination: Normal tone. 5/5 muscle strength in bilateral upper and lower extremities. No cogwheel rigidity.  No muscle wasting, no twitching or fasciculation noted.      Sensory exam:  Normal throughout to light touch and sharp touch in BUE and BLE, vibration sense 3 seconds B toes and B ankles.  Positive Tinel's to bilateral wrist.    Coordination:   Finger to

## 2025-07-02 ENCOUNTER — HOSPITAL ENCOUNTER (OUTPATIENT)
Facility: HOSPITAL | Age: 80
Setting detail: RECURRING SERIES
Discharge: HOME | End: 2025-07-05
Attending: INTERNAL MEDICINE
Payer: MEDICARE

## 2025-07-02 ENCOUNTER — APPOINTMENT (OUTPATIENT)
Facility: HOSPITAL | Age: 80
End: 2025-07-02
Attending: INTERNAL MEDICINE
Payer: MEDICARE

## 2025-07-02 VITALS — BODY MASS INDEX: 25.29 KG/M2 | WEIGHT: 152 LBS

## 2025-07-02 PROCEDURE — 93798 PHYS/QHP OP CAR RHAB W/ECG: CPT

## 2025-07-02 PROCEDURE — 93797 PHYS/QHP OP CAR RHAB WO ECG: CPT

## 2025-07-02 ASSESSMENT — PATIENT HEALTH QUESTIONNAIRE - PHQ9
10. IF YOU CHECKED OFF ANY PROBLEMS, HOW DIFFICULT HAVE THESE PROBLEMS MADE IT FOR YOU TO DO YOUR WORK, TAKE CARE OF THINGS AT HOME, OR GET ALONG WITH OTHER PEOPLE: NOT DIFFICULT AT ALL
7. TROUBLE CONCENTRATING ON THINGS, SUCH AS READING THE NEWSPAPER OR WATCHING TELEVISION: NOT AT ALL
SUM OF ALL RESPONSES TO PHQ QUESTIONS 1-9: 2
9. THOUGHTS THAT YOU WOULD BE BETTER OFF DEAD, OR OF HURTING YOURSELF: NOT AT ALL
2. FEELING DOWN, DEPRESSED OR HOPELESS: NOT AT ALL
5. POOR APPETITE OR OVEREATING: NOT AT ALL
6. FEELING BAD ABOUT YOURSELF - OR THAT YOU ARE A FAILURE OR HAVE LET YOURSELF OR YOUR FAMILY DOWN: NOT AT ALL
8. MOVING OR SPEAKING SO SLOWLY THAT OTHER PEOPLE COULD HAVE NOTICED. OR THE OPPOSITE, BEING SO FIGETY OR RESTLESS THAT YOU HAVE BEEN MOVING AROUND A LOT MORE THAN USUAL: NOT AT ALL
SUM OF ALL RESPONSES TO PHQ QUESTIONS 1-9: 2
4. FEELING TIRED OR HAVING LITTLE ENERGY: SEVERAL DAYS
SUM OF ALL RESPONSES TO PHQ QUESTIONS 1-9: 2
3. TROUBLE FALLING OR STAYING ASLEEP: NOT AT ALL
SUM OF ALL RESPONSES TO PHQ QUESTIONS 1-9: 2
1. LITTLE INTEREST OR PLEASURE IN DOING THINGS: SEVERAL DAYS

## 2025-07-02 ASSESSMENT — EXERCISE STRESS TEST
PEAK_METS: 2.7
PEAK_HR: 107
PEAK_RPE: 13
PEAK_BP: 182/70

## 2025-07-02 NOTE — CARDIO/PULMONARY
I    INTAKE APPOINTMENT NOTE  2025    NAME: Telma Bautista : 1945 AGE: 80 y.o.  GENDER: female    CARDIAC REHAB ADMITTING DIAGNOSIS: Stented coronary artery [Z95.5]    REFERRING PHYSICIAN: Osiris Aguilar MD    MEDICAL HX:  No past medical history on file.    LABS:     No results found for: \"HBA1C\", \"[id number]\"  Lab Results   Component Value Date/Time    CHOL 192 2021 12:00 AM    HDL 59 2021 12:00 AM     2021 12:00 AM    VLDL 9 2021 12:00 AM         ANTHROPOMETRICS:      Ht Readings from Last 1 Encounters:   25 1.651 m (5' 5\")      Wt Readings from Last 1 Encounters:   25 67.6 kg (149 lb)        WAIST: 32       VISIT SUMMARY:    Telma Bautista 80 y.o. presented to Cardiac Rehab for program orientation and 6 minute walk test today with a primary diagnosis of Stented coronary artery [Z95.5]. EF is  (not in cc) Cardiac risk factors include dyslipidemia, hypertension.  Telma Bautista is not  and lives alone.  Patient was evaluated for depression using the PHQ-9 assessment tool with a result of 2 which is considered mild. The result was discussed with patient..  Patient denied chest pain or SOB during 6 minute walk test and was in  SR/ST. Patient walked  330 meters at a speed of 2.2  and grade of 0  for a final MET level of 2.7 .   Exercise prescription developed using exercise tolerance results and patient stated goals, to be supplemented with home exercise recommendations. Education manual given to patient and reviewed. Patient will attend cardiac rehab 2-3 times/week which will include both exercise and education sessions.    Patient states that she would like to accomplish the following by completion of the program:  30 minutes of daily exercise, improvement in lipid panel.     Intake Start Time: 0900  Intake End Time: 1045    Vandana Reynoso RN

## 2025-07-07 ENCOUNTER — APPOINTMENT (OUTPATIENT)
Facility: HOSPITAL | Age: 80
End: 2025-07-07
Attending: INTERNAL MEDICINE
Payer: MEDICARE

## 2025-07-08 ENCOUNTER — HOSPITAL ENCOUNTER (OUTPATIENT)
Facility: HOSPITAL | Age: 80
Setting detail: RECURRING SERIES
End: 2025-07-08
Attending: INTERNAL MEDICINE
Payer: MEDICARE

## 2025-07-09 ENCOUNTER — APPOINTMENT (OUTPATIENT)
Facility: HOSPITAL | Age: 80
End: 2025-07-09
Attending: INTERNAL MEDICINE
Payer: MEDICARE

## 2025-07-14 ENCOUNTER — APPOINTMENT (OUTPATIENT)
Facility: HOSPITAL | Age: 80
End: 2025-07-14
Attending: INTERNAL MEDICINE
Payer: MEDICARE

## 2025-07-14 ENCOUNTER — TELEPHONE (OUTPATIENT)
Age: 80
End: 2025-07-14

## 2025-07-14 NOTE — TELEPHONE ENCOUNTER
Patient calling to see if her skin biopsy on 8/15/2025 can be rescheduled to another day.     Please call her back at .  Thank you.

## 2025-07-15 ENCOUNTER — HOSPITAL ENCOUNTER (OUTPATIENT)
Facility: HOSPITAL | Age: 80
Setting detail: RECURRING SERIES
Discharge: HOME | End: 2025-07-18
Attending: INTERNAL MEDICINE
Payer: MEDICARE

## 2025-07-15 VITALS — BODY MASS INDEX: 25.29 KG/M2 | WEIGHT: 152 LBS

## 2025-07-15 PROCEDURE — 93798 PHYS/QHP OP CAR RHAB W/ECG: CPT

## 2025-07-15 ASSESSMENT — EXERCISE STRESS TEST
PEAK_RPE: 12
PEAK_HR: 104
PEAK_METS: 2.6

## 2025-07-16 ENCOUNTER — HOSPITAL ENCOUNTER (OUTPATIENT)
Facility: HOSPITAL | Age: 80
Setting detail: RECURRING SERIES
Discharge: HOME | End: 2025-07-19
Attending: INTERNAL MEDICINE
Payer: MEDICARE

## 2025-07-16 ENCOUNTER — APPOINTMENT (OUTPATIENT)
Facility: HOSPITAL | Age: 80
End: 2025-07-16
Attending: INTERNAL MEDICINE
Payer: MEDICARE

## 2025-07-16 VITALS — WEIGHT: 152 LBS | BODY MASS INDEX: 25.29 KG/M2

## 2025-07-16 PROCEDURE — 93798 PHYS/QHP OP CAR RHAB W/ECG: CPT

## 2025-07-16 ASSESSMENT — EXERCISE STRESS TEST
PEAK_RPE: 12
PEAK_METS: 2.6
PEAK_HR: 107

## 2025-07-17 NOTE — TELEPHONE ENCOUNTER
Called patient back for reschedule. No answer and voicemail box is full and unable to leave voicemail.     Sent [employer] message notifying patient of reschedule dates and times.

## 2025-07-21 ENCOUNTER — APPOINTMENT (OUTPATIENT)
Facility: HOSPITAL | Age: 80
End: 2025-07-21
Attending: INTERNAL MEDICINE
Payer: MEDICARE

## 2025-07-22 ENCOUNTER — HOSPITAL ENCOUNTER (OUTPATIENT)
Facility: HOSPITAL | Age: 80
Setting detail: RECURRING SERIES
Discharge: HOME | End: 2025-07-25
Attending: INTERNAL MEDICINE
Payer: MEDICARE

## 2025-07-22 VITALS — WEIGHT: 152 LBS | BODY MASS INDEX: 25.29 KG/M2

## 2025-07-22 PROCEDURE — 93798 PHYS/QHP OP CAR RHAB W/ECG: CPT

## 2025-07-22 ASSESSMENT — EXERCISE STRESS TEST
PEAK_HR: 94
PEAK_METS: 2.8
PEAK_RPE: 12

## 2025-07-23 ENCOUNTER — APPOINTMENT (OUTPATIENT)
Facility: HOSPITAL | Age: 80
End: 2025-07-23
Attending: INTERNAL MEDICINE
Payer: MEDICARE

## 2025-07-23 ENCOUNTER — HOSPITAL ENCOUNTER (OUTPATIENT)
Facility: HOSPITAL | Age: 80
Setting detail: RECURRING SERIES
Discharge: HOME | End: 2025-07-26
Attending: INTERNAL MEDICINE
Payer: MEDICARE

## 2025-07-23 VITALS — BODY MASS INDEX: 25.29 KG/M2 | WEIGHT: 152 LBS

## 2025-07-23 PROCEDURE — 93798 PHYS/QHP OP CAR RHAB W/ECG: CPT

## 2025-07-23 ASSESSMENT — EXERCISE STRESS TEST
PEAK_METS: 2.8
PEAK_RPE: 12
PEAK_HR: 99

## 2025-07-28 ENCOUNTER — APPOINTMENT (OUTPATIENT)
Facility: HOSPITAL | Age: 80
End: 2025-07-28
Attending: INTERNAL MEDICINE
Payer: MEDICARE

## 2025-07-29 ENCOUNTER — HOSPITAL ENCOUNTER (OUTPATIENT)
Facility: HOSPITAL | Age: 80
Setting detail: RECURRING SERIES
Discharge: HOME | End: 2025-08-01
Attending: INTERNAL MEDICINE
Payer: MEDICARE

## 2025-07-29 VITALS — WEIGHT: 151 LBS | BODY MASS INDEX: 25.13 KG/M2

## 2025-07-29 PROCEDURE — 93798 PHYS/QHP OP CAR RHAB W/ECG: CPT

## 2025-07-29 ASSESSMENT — EXERCISE STRESS TEST
PEAK_HR: 111
PEAK_RPE: 12
PEAK_METS: 2.8

## 2025-07-30 ENCOUNTER — HOSPITAL ENCOUNTER (OUTPATIENT)
Facility: HOSPITAL | Age: 80
Setting detail: RECURRING SERIES
Discharge: HOME | End: 2025-08-02
Attending: INTERNAL MEDICINE
Payer: MEDICARE

## 2025-07-30 ENCOUNTER — APPOINTMENT (OUTPATIENT)
Facility: HOSPITAL | Age: 80
End: 2025-07-30
Attending: INTERNAL MEDICINE
Payer: MEDICARE

## 2025-07-30 VITALS — BODY MASS INDEX: 25.29 KG/M2 | WEIGHT: 152 LBS

## 2025-07-30 PROCEDURE — 93798 PHYS/QHP OP CAR RHAB W/ECG: CPT

## 2025-07-30 ASSESSMENT — EXERCISE STRESS TEST
PEAK_HR: 106
PEAK_METS: 2.8
PEAK_RPE: 12

## 2025-08-04 ENCOUNTER — HOSPITAL ENCOUNTER (OUTPATIENT)
Facility: HOSPITAL | Age: 80
Setting detail: RECURRING SERIES
Discharge: HOME | End: 2025-08-07
Attending: INTERNAL MEDICINE
Payer: MEDICARE

## 2025-08-04 VITALS — WEIGHT: 153 LBS | BODY MASS INDEX: 25.46 KG/M2

## 2025-08-04 PROCEDURE — 93798 PHYS/QHP OP CAR RHAB W/ECG: CPT

## 2025-08-04 ASSESSMENT — EXERCISE STRESS TEST
PEAK_METS: 2.8
PEAK_HR: 108
PEAK_RPE: 12

## 2025-08-05 ENCOUNTER — APPOINTMENT (OUTPATIENT)
Facility: HOSPITAL | Age: 80
End: 2025-08-05
Attending: INTERNAL MEDICINE
Payer: MEDICARE

## 2025-08-06 ENCOUNTER — APPOINTMENT (OUTPATIENT)
Facility: HOSPITAL | Age: 80
End: 2025-08-06
Attending: INTERNAL MEDICINE
Payer: MEDICARE

## 2025-08-07 ENCOUNTER — HOSPITAL ENCOUNTER (OUTPATIENT)
Facility: HOSPITAL | Age: 80
Setting detail: RECURRING SERIES
Discharge: HOME | End: 2025-08-10
Attending: INTERNAL MEDICINE
Payer: MEDICARE

## 2025-08-07 VITALS — WEIGHT: 152 LBS | BODY MASS INDEX: 25.29 KG/M2

## 2025-08-07 PROCEDURE — 93798 PHYS/QHP OP CAR RHAB W/ECG: CPT

## 2025-08-07 ASSESSMENT — EXERCISE STRESS TEST
PEAK_METS: 2.8
PEAK_HR: 105
PEAK_RPE: 12

## 2025-08-11 ENCOUNTER — APPOINTMENT (OUTPATIENT)
Facility: HOSPITAL | Age: 80
End: 2025-08-11
Attending: INTERNAL MEDICINE
Payer: MEDICARE

## 2025-08-12 ENCOUNTER — APPOINTMENT (OUTPATIENT)
Facility: HOSPITAL | Age: 80
End: 2025-08-12
Attending: INTERNAL MEDICINE
Payer: MEDICARE

## 2025-08-13 ENCOUNTER — APPOINTMENT (OUTPATIENT)
Facility: HOSPITAL | Age: 80
End: 2025-08-13
Attending: INTERNAL MEDICINE
Payer: MEDICARE

## 2025-08-14 ENCOUNTER — HOSPITAL ENCOUNTER (OUTPATIENT)
Facility: HOSPITAL | Age: 80
Setting detail: RECURRING SERIES
Discharge: HOME | End: 2025-08-17
Attending: INTERNAL MEDICINE
Payer: MEDICARE

## 2025-08-14 VITALS — BODY MASS INDEX: 25.29 KG/M2 | WEIGHT: 152 LBS

## 2025-08-14 PROCEDURE — 93798 PHYS/QHP OP CAR RHAB W/ECG: CPT

## 2025-08-14 ASSESSMENT — EXERCISE STRESS TEST
PEAK_RPE: 12
PEAK_METS: 2.8
PEAK_HR: 108

## 2025-08-15 ENCOUNTER — PROCEDURE VISIT (OUTPATIENT)
Age: 80
End: 2025-08-15

## 2025-08-15 DIAGNOSIS — G62.9 POLYNEUROPATHY, UNSPECIFIED: Primary | ICD-10-CM

## 2025-08-18 ENCOUNTER — HOSPITAL ENCOUNTER (OUTPATIENT)
Facility: HOSPITAL | Age: 80
Setting detail: RECURRING SERIES
Discharge: HOME | End: 2025-08-21
Attending: INTERNAL MEDICINE
Payer: MEDICARE

## 2025-08-18 VITALS — BODY MASS INDEX: 25.29 KG/M2 | WEIGHT: 152 LBS

## 2025-08-18 PROCEDURE — 93798 PHYS/QHP OP CAR RHAB W/ECG: CPT

## 2025-08-18 ASSESSMENT — EXERCISE STRESS TEST
PEAK_HR: 99
PEAK_RPE: 12
PEAK_METS: 2.8

## 2025-08-21 ENCOUNTER — HOSPITAL ENCOUNTER (OUTPATIENT)
Facility: HOSPITAL | Age: 80
Setting detail: RECURRING SERIES
Discharge: HOME | End: 2025-08-24
Attending: INTERNAL MEDICINE
Payer: MEDICARE

## 2025-08-21 VITALS — WEIGHT: 153 LBS | BODY MASS INDEX: 25.46 KG/M2

## 2025-08-21 PROCEDURE — 93798 PHYS/QHP OP CAR RHAB W/ECG: CPT

## 2025-08-21 ASSESSMENT — EXERCISE STRESS TEST
PEAK_RPE: 12
PEAK_METS: 3.2
PEAK_HR: 100

## 2025-08-25 ENCOUNTER — HOSPITAL ENCOUNTER (OUTPATIENT)
Facility: HOSPITAL | Age: 80
Setting detail: RECURRING SERIES
Discharge: HOME | End: 2025-08-28
Attending: INTERNAL MEDICINE
Payer: MEDICARE

## 2025-08-25 VITALS — BODY MASS INDEX: 25.63 KG/M2 | WEIGHT: 154 LBS

## 2025-08-25 PROCEDURE — 93798 PHYS/QHP OP CAR RHAB W/ECG: CPT

## 2025-08-25 ASSESSMENT — EXERCISE STRESS TEST
PEAK_RPE: 12
PEAK_HR: 111
PEAK_METS: 3.2

## 2025-08-28 ENCOUNTER — HOSPITAL ENCOUNTER (OUTPATIENT)
Facility: HOSPITAL | Age: 80
Setting detail: RECURRING SERIES
Discharge: HOME | End: 2025-08-31
Attending: INTERNAL MEDICINE
Payer: MEDICARE

## 2025-08-28 VITALS — BODY MASS INDEX: 25.29 KG/M2 | WEIGHT: 152 LBS

## 2025-08-28 PROCEDURE — 93798 PHYS/QHP OP CAR RHAB W/ECG: CPT

## 2025-08-28 ASSESSMENT — EXERCISE STRESS TEST
PEAK_HR: 99
PEAK_RPE: DID NOT RECORD
PEAK_METS: 3.2

## 2025-09-03 ENCOUNTER — CLINICAL DOCUMENTATION (OUTPATIENT)
Age: 80
End: 2025-09-03

## 2025-10-01 ENCOUNTER — APPOINTMENT (OUTPATIENT)
Facility: HOSPITAL | Age: 80
End: 2025-10-01
Attending: INTERNAL MEDICINE
Payer: MEDICARE

## 2025-10-06 ENCOUNTER — APPOINTMENT (OUTPATIENT)
Facility: HOSPITAL | Age: 80
End: 2025-10-06
Attending: INTERNAL MEDICINE
Payer: MEDICARE

## 2025-10-08 ENCOUNTER — APPOINTMENT (OUTPATIENT)
Facility: HOSPITAL | Age: 80
End: 2025-10-08
Attending: INTERNAL MEDICINE
Payer: MEDICARE

## 2025-10-13 ENCOUNTER — APPOINTMENT (OUTPATIENT)
Facility: HOSPITAL | Age: 80
End: 2025-10-13
Attending: INTERNAL MEDICINE
Payer: MEDICARE

## 2025-10-15 ENCOUNTER — APPOINTMENT (OUTPATIENT)
Facility: HOSPITAL | Age: 80
End: 2025-10-15
Attending: INTERNAL MEDICINE
Payer: MEDICARE

## 2025-10-20 ENCOUNTER — APPOINTMENT (OUTPATIENT)
Facility: HOSPITAL | Age: 80
End: 2025-10-20
Attending: INTERNAL MEDICINE
Payer: MEDICARE

## 2025-10-22 ENCOUNTER — APPOINTMENT (OUTPATIENT)
Facility: HOSPITAL | Age: 80
End: 2025-10-22
Attending: INTERNAL MEDICINE
Payer: MEDICARE

## (undated) DEVICE — PRESSURE GUIDEWIRE: Brand: COMET™ II

## (undated) DEVICE — SUTURE VCRL SZ 0 L27IN ABSRB VLT L36MM CT-1 1/2 CIR J340H

## (undated) DEVICE — BIT DRL L330MM DIA4.2MM CALIB 100MM 3 FLUT QUIK CPL

## (undated) DEVICE — HANDLE LT SNAP ON ULT DURABLE LENS FOR TRUMPF ALC DISPOSABLE

## (undated) DEVICE — SLIM BODY SKIN STAPLER: Brand: APPOSE ULC

## (undated) DEVICE — CATHETER GUID 5FR L100CM DIA0.058IN NYL SHFT JR4.0 W/O SIDE

## (undated) DEVICE — STERILE POLYISOPRENE POWDER-FREE SURGICAL GLOVES WITH EMOLLIENT COATING: Brand: PROTEXIS

## (undated) DEVICE — (D)PREP SKN CHLRAPRP APPL 26ML -- CONVERT TO ITEM 371833

## (undated) DEVICE — STERILE POLYISOPRENE POWDER-FREE SURGICAL GLOVES: Brand: PROTEXIS

## (undated) DEVICE — TR BAND RADIAL ARTERY COMPRESSION DEVICE: Brand: TR BAND

## (undated) DEVICE — PROVE COVER: Brand: UNBRANDED

## (undated) DEVICE — SUTURE VCRL SZ 2-0 L36IN ABSRB VLT L36MM CT-1 1/2 CIR J345H

## (undated) DEVICE — CATH BLLN ANGIO 2.75X12MM NC EUPHORIA RX

## (undated) DEVICE — SC 3W HP RA OFF NB - PG: Brand: NAMIC

## (undated) DEVICE — GUIDEWIRE VASC L260CM 0.035IN J TIP L3MM PTFE FIX COR NAMIC

## (undated) DEVICE — GLIDESHEATH SLENDER ACCESS KIT: Brand: GLIDESHEATH SLENDER

## (undated) DEVICE — 6619 2 PTNT ISO SYS INCISE AREA&LT;(&GT;&&LT;)&GT;P: Brand: STERI-DRAPE™ IOBAN™ 2

## (undated) DEVICE — 3M™ TEGADERM™ TRANSPARENT FILM DRESSING FRAME STYLE, 1626W, 4 IN X 4-3/4 IN (10 CM X 12 CM), 50/CT 4CT/CASE: Brand: 3M™ TEGADERM™

## (undated) DEVICE — HI-TORQUE VERSACORE FLOPPY GUIDE WIRE SYSTEM 145 CM: Brand: HI-TORQUE VERSACORE

## (undated) DEVICE — Device

## (undated) DEVICE — SOLUTION IV 1000ML 0.9% SOD CHL

## (undated) DEVICE — WATERPROOF, BACTERIA PROOF DRESSING WITH ABSORBENT SEE THROUGH PAD: Brand: OPSITE POST-OP VISIBLE 20X10CM CTN 20

## (undated) DEVICE — CATHETER GUID 6FR L100CM DIA0.071IN NYL SHFT EBU3.5

## (undated) DEVICE — TUBING PRSS MON L6IN PVC M FEM CONN

## (undated) DEVICE — ROD RMR L950MM DIA2.5MM W/ EXTN BALL TIP

## (undated) DEVICE — CATHETER GUID 5FR L100CM DIA0.058IN NYL SHFT EBU3.0 W/O

## (undated) DEVICE — 1200 GUARD II KIT W/5MM TUBE W/O VAC TUBE: Brand: GUARDIAN

## (undated) DEVICE — DRAPE,U/ SHT,SPLIT,PLAS,STERIL: Brand: MEDLINE

## (undated) DEVICE — RUNTHROUGH NS EXTRA FLOPPY PTCA GUIDEWIRE: Brand: RUNTHROUGH

## (undated) DEVICE — CATHETER COR DIAG 4.0 5FR 100CM 0 SIDE H

## (undated) DEVICE — GOWN,SIRUS,NONRNF,SETINSLV,XL,20/CS: Brand: MEDLINE

## (undated) DEVICE — SURGICAL PROCEDURE PACK BASIN MAJ SET CUST NO CAUT

## (undated) DEVICE — DRAPE SHT 3 QTR PROXIMA 53X77 --

## (undated) DEVICE — COVER,TABLE,60X90,STERILE: Brand: MEDLINE

## (undated) DEVICE — TOWEL,OR,DSP,ST,BLUE,STD,2/PK,40PK/CS: Brand: MEDLINE

## (undated) DEVICE — DEVICE INFL 20ML 30ATM DGT FLD DISPNS SYR W ACCESSPLUS BLU

## (undated) DEVICE — 3.2MM GUIDE WIRE 400MM

## (undated) DEVICE — HEART CATH-MRMC: Brand: MEDLINE INDUSTRIES, INC.

## (undated) DEVICE — SYRINGE ANGIO 10 CC BRL STD PRNT POLYCARB LT BLU MEDALLION

## (undated) DEVICE — CATH BLLN ANGIO 2.25X15MM SC EUPHORA RX

## (undated) DEVICE — VALVE HEMSTAS W/ GWIRE INSRTN TOOL FOR 8FR DEV GRDIAN II